# Patient Record
Sex: MALE | Race: WHITE | Employment: OTHER | URBAN - METROPOLITAN AREA
[De-identification: names, ages, dates, MRNs, and addresses within clinical notes are randomized per-mention and may not be internally consistent; named-entity substitution may affect disease eponyms.]

---

## 2018-04-24 ENCOUNTER — OFFICE VISIT (OUTPATIENT)
Dept: CARDIOLOGY CLINIC | Age: 48
End: 2018-04-24

## 2018-04-24 VITALS
WEIGHT: 196.12 LBS | OXYGEN SATURATION: 92 % | HEIGHT: 70 IN | DIASTOLIC BLOOD PRESSURE: 78 MMHG | HEART RATE: 89 BPM | SYSTOLIC BLOOD PRESSURE: 118 MMHG | BODY MASS INDEX: 28.08 KG/M2

## 2018-04-24 DIAGNOSIS — E78.2 MIXED HYPERLIPIDEMIA: ICD-10-CM

## 2018-04-24 DIAGNOSIS — Z72.0 TOBACCO ABUSE: ICD-10-CM

## 2018-04-24 DIAGNOSIS — I10 ESSENTIAL HYPERTENSION: ICD-10-CM

## 2018-04-24 DIAGNOSIS — R00.2 PALPITATIONS: Primary | ICD-10-CM

## 2018-04-24 PROCEDURE — 3017F COLORECTAL CA SCREEN DOC REV: CPT | Performed by: NURSE PRACTITIONER

## 2018-04-24 PROCEDURE — G8419 CALC BMI OUT NRM PARAM NOF/U: HCPCS | Performed by: NURSE PRACTITIONER

## 2018-04-24 PROCEDURE — 99214 OFFICE O/P EST MOD 30 MIN: CPT | Performed by: NURSE PRACTITIONER

## 2018-04-24 PROCEDURE — G8427 DOCREV CUR MEDS BY ELIG CLIN: HCPCS | Performed by: NURSE PRACTITIONER

## 2018-04-24 PROCEDURE — 4004F PT TOBACCO SCREEN RCVD TLK: CPT | Performed by: NURSE PRACTITIONER

## 2018-04-24 RX ORDER — NIACIN 100 MG
100 TABLET ORAL DAILY
COMMUNITY
End: 2019-09-17

## 2018-05-07 ENCOUNTER — HOSPITAL ENCOUNTER (OUTPATIENT)
Dept: NON INVASIVE DIAGNOSTICS | Age: 48
Discharge: HOME | End: 2018-05-07
Attending: NURSE PRACTITIONER | Admitting: NURSE PRACTITIONER

## 2018-05-07 DIAGNOSIS — R00.2 PALPITATIONS: ICD-10-CM

## 2018-05-07 LAB
LEFT VENTRICULAR EJECTION FRACTION HIGH VALUE: 60 %
LEFT VENTRICULAR EJECTION FRACTION MODE: NORMAL
LV EF: 55 %

## 2018-09-28 ENCOUNTER — OFFICE VISIT (OUTPATIENT)
Dept: ORTHOPEDIC SURGERY | Age: 48
End: 2018-09-28
Payer: COMMERCIAL

## 2018-09-28 VITALS
BODY MASS INDEX: 28.06 KG/M2 | HEART RATE: 85 BPM | DIASTOLIC BLOOD PRESSURE: 76 MMHG | SYSTOLIC BLOOD PRESSURE: 134 MMHG | WEIGHT: 196 LBS | HEIGHT: 70 IN

## 2018-09-28 DIAGNOSIS — M25.562 PAIN IN BOTH KNEES, UNSPECIFIED CHRONICITY: Primary | ICD-10-CM

## 2018-09-28 DIAGNOSIS — M25.40 PAINFUL SWELLING OF JOINT: ICD-10-CM

## 2018-09-28 DIAGNOSIS — M25.561 PAIN IN BOTH KNEES, UNSPECIFIED CHRONICITY: ICD-10-CM

## 2018-09-28 DIAGNOSIS — M25.561 PAIN IN BOTH KNEES, UNSPECIFIED CHRONICITY: Primary | ICD-10-CM

## 2018-09-28 DIAGNOSIS — M25.562 PAIN IN BOTH KNEES, UNSPECIFIED CHRONICITY: ICD-10-CM

## 2018-09-28 LAB
C-REACTIVE PROTEIN: 0.8 MG/L (ref 0–5.1)
RHEUMATOID FACTOR: <10 IU/ML
SEDIMENTATION RATE, ERYTHROCYTE: 2 MM/HR (ref 0–20)
URIC ACID, SERUM: 6 MG/DL (ref 3.5–7.2)

## 2018-09-28 PROCEDURE — 4004F PT TOBACCO SCREEN RCVD TLK: CPT | Performed by: ORTHOPAEDIC SURGERY

## 2018-09-28 PROCEDURE — G8427 DOCREV CUR MEDS BY ELIG CLIN: HCPCS | Performed by: ORTHOPAEDIC SURGERY

## 2018-09-28 PROCEDURE — 3017F COLORECTAL CA SCREEN DOC REV: CPT | Performed by: ORTHOPAEDIC SURGERY

## 2018-09-28 PROCEDURE — 99203 OFFICE O/P NEW LOW 30 MIN: CPT | Performed by: ORTHOPAEDIC SURGERY

## 2018-09-28 PROCEDURE — 20610 DRAIN/INJ JOINT/BURSA W/O US: CPT | Performed by: ORTHOPAEDIC SURGERY

## 2018-09-28 PROCEDURE — G8419 CALC BMI OUT NRM PARAM NOF/U: HCPCS | Performed by: ORTHOPAEDIC SURGERY

## 2018-09-28 RX ORDER — BETAMETHASONE SODIUM PHOSPHATE AND BETAMETHASONE ACETATE 3; 3 MG/ML; MG/ML
12 INJECTION, SUSPENSION INTRA-ARTICULAR; INTRALESIONAL; INTRAMUSCULAR; SOFT TISSUE ONCE
Status: COMPLETED | OUTPATIENT
Start: 2018-09-28 | End: 2018-09-28

## 2018-09-28 RX ADMIN — BETAMETHASONE SODIUM PHOSPHATE AND BETAMETHASONE ACETATE 12 MG: 3; 3 INJECTION, SUSPENSION INTRA-ARTICULAR; INTRALESIONAL; INTRAMUSCULAR; SOFT TISSUE at 11:12

## 2018-09-28 RX ADMIN — BETAMETHASONE SODIUM PHOSPHATE AND BETAMETHASONE ACETATE 12 MG: 3; 3 INJECTION, SUSPENSION INTRA-ARTICULAR; INTRALESIONAL; INTRAMUSCULAR; SOFT TISSUE at 11:13

## 2018-10-01 ENCOUNTER — TELEPHONE (OUTPATIENT)
Dept: ORTHOPEDIC SURGERY | Age: 48
End: 2018-10-01

## 2018-10-01 ENCOUNTER — TELEPHONE (OUTPATIENT)
Dept: CARDIOLOGY CLINIC | Age: 48
End: 2018-10-01

## 2018-10-01 DIAGNOSIS — R07.9 CHEST PAIN, UNSPECIFIED TYPE: ICD-10-CM

## 2018-10-01 DIAGNOSIS — R00.2 PALPITATIONS: ICD-10-CM

## 2018-10-01 DIAGNOSIS — R00.0 TACHYCARDIA: Primary | ICD-10-CM

## 2018-10-01 LAB
ANA INTERPRETATION: NORMAL
ANTI-NUCLEAR ANTIBODY (ANA): NEGATIVE

## 2019-01-04 RX ORDER — LISINOPRIL 5 MG/1
5 TABLET ORAL DAILY
Qty: 90 TABLET | Refills: 1 | Status: SHIPPED | OUTPATIENT
Start: 2019-01-04 | End: 2019-07-12 | Stop reason: SDUPTHER

## 2019-04-02 ENCOUNTER — OFFICE VISIT (OUTPATIENT)
Dept: ORTHOPEDIC SURGERY | Age: 49
End: 2019-04-02
Payer: COMMERCIAL

## 2019-04-02 DIAGNOSIS — S39.012A LUMBAR STRAIN, INITIAL ENCOUNTER: Primary | ICD-10-CM

## 2019-04-02 DIAGNOSIS — M54.59 MECHANICAL LOW BACK PAIN: ICD-10-CM

## 2019-04-02 PROCEDURE — G8427 DOCREV CUR MEDS BY ELIG CLIN: HCPCS | Performed by: INTERNAL MEDICINE

## 2019-04-02 PROCEDURE — 4004F PT TOBACCO SCREEN RCVD TLK: CPT | Performed by: INTERNAL MEDICINE

## 2019-04-02 PROCEDURE — 99213 OFFICE O/P EST LOW 20 MIN: CPT | Performed by: INTERNAL MEDICINE

## 2019-04-02 PROCEDURE — 3017F COLORECTAL CA SCREEN DOC REV: CPT | Performed by: INTERNAL MEDICINE

## 2019-04-02 PROCEDURE — G8419 CALC BMI OUT NRM PARAM NOF/U: HCPCS | Performed by: INTERNAL MEDICINE

## 2019-04-02 RX ORDER — CYCLOBENZAPRINE HCL 10 MG
10 TABLET ORAL NIGHTLY PRN
Qty: 20 TABLET | Refills: 1 | Status: SHIPPED | OUTPATIENT
Start: 2019-04-02 | End: 2019-04-12

## 2019-04-02 RX ORDER — KETOROLAC TROMETHAMINE 10 MG/1
10 TABLET, FILM COATED ORAL 3 TIMES DAILY
Qty: 15 TABLET | Refills: 0 | Status: SHIPPED | OUTPATIENT
Start: 2019-04-02 | End: 2019-09-17 | Stop reason: ALTCHOICE

## 2019-04-02 NOTE — PROGRESS NOTES
daily      clonazePAM (KLONOPIN) 1 MG tablet Take 1 mg by mouth 2 times daily as needed.  fish oil-omega-3 fatty acids 1000 MG capsule Take 2 g by mouth daily. No current facility-administered medications for this visit. Allergies:         Allergies   Allergen Reactions    Caffeine Other (See Comments)     Heart Palp    Betamethasone Palpitations and Other (See Comments)     Increased heart rate        Social History:         Social History     Socioeconomic History    Marital status:      Spouse name: Not on file    Number of children: Not on file    Years of education: Not on file    Highest education level: Not on file   Occupational History    Not on file   Social Needs    Financial resource strain: Not on file    Food insecurity:     Worry: Not on file     Inability: Not on file    Transportation needs:     Medical: Not on file     Non-medical: Not on file   Tobacco Use    Smoking status: Former Smoker    Smokeless tobacco: Current User   Substance and Sexual Activity    Alcohol use: Yes     Comment: seldom    Drug use: No    Sexual activity: Not on file   Lifestyle    Physical activity:     Days per week: Not on file     Minutes per session: Not on file    Stress: Not on file   Relationships    Social connections:     Talks on phone: Not on file     Gets together: Not on file     Attends Hoahaoism service: Not on file     Active member of club or organization: Not on file     Attends meetings of clubs or organizations: Not on file     Relationship status: Not on file    Intimate partner violence:     Fear of current or ex partner: Not on file     Emotionally abused: Not on file     Physically abused: Not on file     Forced sexual activity: Not on file   Other Topics Concern    Not on file   Social History Narrative    Not on file        Review of Symptoms:    Pertinent items are noted in HPI    Review of systems reviewed from Patient History Form dated on today's date and   available in the patient's chart under the Media tab. Vital Signs: There were no vitals filed for this visit. General Exam:     Constitutional: Patient is adequately groomed with no evidence of malnutrition  Mental Status: The patient is oriented to time, place and person. The patient's mood and affect are appropriate. Vascular: Examination reveals no swelling or calf tenderness. Peripheral pulses are palpable and 2+. Lymphatics: no lymphadenopathy of the inguinal region or lower extremity      Physical Exam: lower back      Primary Exam:    Inspection:  No deformity atrophy or appreciable curvature      Palpation:  A symmetric muscular spasm right paraxial with mild discomfort to palpation      Range of Motion:  80/10 mild pain in flexion greater than extension      Strength:  Normal lower extremity      Special Tests:  Negative SLR      Skin: There are no rashes, ulcerations or lesions. Gait: Normal      Reflex intact lower     Additional Comments:        Additional Examinations:           Right Lower Extremity: Examination of the right lower extremity does not show any tenderness, deformity or injury. Range of motion is unremarkable. There is no gross instability. There are no rashes, ulcerations or lesions. Strength and tone are normal.  Left Lower Extremity: Examination of the left lower extremity does not show any tenderness, deformity or injury. Range of motion is unremarkable. There is no gross instability. There are no rashes, ulcerations or lesions. Strength and tone are normal.  Neurolgic -Light touch sensation and manual muscle testing normal L2-S1. No fasiculations. Pattella tendon and Achilles tendon reflexes +2 bilaterally. Seated SLR negative          Office Imaging Results/Procedures PerformedToday:     3 views  Lumbar spine:     There is subtle rightward curvature on the AP projection 5 non rib-bearing vertebral bodies are evident lateral projection apologize for any errors. \"

## 2019-07-12 ENCOUNTER — TELEPHONE (OUTPATIENT)
Dept: CARDIOLOGY CLINIC | Age: 49
End: 2019-07-12

## 2019-07-12 RX ORDER — LISINOPRIL 5 MG/1
5 TABLET ORAL DAILY
Qty: 30 TABLET | Refills: 2 | Status: SHIPPED | OUTPATIENT
Start: 2019-07-12 | End: 2019-09-17 | Stop reason: SDUPTHER

## 2019-09-17 ENCOUNTER — HOSPITAL ENCOUNTER (OUTPATIENT)
Age: 49
Discharge: HOME | End: 2019-09-17
Payer: COMMERCIAL

## 2019-09-17 ENCOUNTER — TELEPHONE (OUTPATIENT)
Dept: CARDIOLOGY CLINIC | Age: 49
End: 2019-09-17

## 2019-09-17 ENCOUNTER — HOSPITAL ENCOUNTER (OUTPATIENT)
Dept: GENERAL RADIOLOGY | Age: 49
Discharge: HOME | End: 2019-09-17
Payer: COMMERCIAL

## 2019-09-17 ENCOUNTER — OFFICE VISIT (OUTPATIENT)
Dept: CARDIOLOGY CLINIC | Age: 49
End: 2019-09-17
Payer: COMMERCIAL

## 2019-09-17 VITALS
DIASTOLIC BLOOD PRESSURE: 62 MMHG | SYSTOLIC BLOOD PRESSURE: 132 MMHG | OXYGEN SATURATION: 97 % | WEIGHT: 207 LBS | HEIGHT: 70 IN | HEART RATE: 69 BPM | BODY MASS INDEX: 29.63 KG/M2

## 2019-09-17 DIAGNOSIS — R06.02 SOB (SHORTNESS OF BREATH): ICD-10-CM

## 2019-09-17 DIAGNOSIS — E78.2 MIXED HYPERLIPIDEMIA: ICD-10-CM

## 2019-09-17 DIAGNOSIS — R07.89 OTHER CHEST PAIN: ICD-10-CM

## 2019-09-17 DIAGNOSIS — I10 ESSENTIAL HYPERTENSION: Primary | ICD-10-CM

## 2019-09-17 PROCEDURE — 71046 X-RAY EXAM CHEST 2 VIEWS: CPT

## 2019-09-17 PROCEDURE — 4004F PT TOBACCO SCREEN RCVD TLK: CPT | Performed by: NURSE PRACTITIONER

## 2019-09-17 PROCEDURE — G8427 DOCREV CUR MEDS BY ELIG CLIN: HCPCS | Performed by: NURSE PRACTITIONER

## 2019-09-17 PROCEDURE — G8419 CALC BMI OUT NRM PARAM NOF/U: HCPCS | Performed by: NURSE PRACTITIONER

## 2019-09-17 PROCEDURE — 3017F COLORECTAL CA SCREEN DOC REV: CPT | Performed by: NURSE PRACTITIONER

## 2019-09-17 PROCEDURE — 99214 OFFICE O/P EST MOD 30 MIN: CPT | Performed by: NURSE PRACTITIONER

## 2019-09-17 RX ORDER — LISINOPRIL 5 MG/1
5 TABLET ORAL DAILY
Qty: 90 TABLET | Refills: 3 | Status: SHIPPED | OUTPATIENT
Start: 2019-09-17 | End: 2020-02-11 | Stop reason: ALTCHOICE

## 2019-09-17 NOTE — PROGRESS NOTES
PND, orthopnea or cough. CARDIOVASCULAR: See HPI  GI: No nausea, vomiting, diarrhea, constipation, abdominal pain or changes in bowel habits. : No urinary frequency, urgency, incontinence hematuria or dysuria. SKIN: No cyanosis or skin lesions. MUSCULOSKELETAL: No new muscle or joint pain. NEUROLOGICAL: No syncope or TIA-like symptoms. PSYCHIATRIC: No anxiety, pain, insomnia or depression    Objective:   PHYSICAL EXAM:       Vitals:    09/17/19 0929 09/17/19 0959   BP: 132/82 132/62   Site: Right Upper Arm Left Upper Arm   Position: Sitting    Cuff Size: Medium Adult Large Adult   Pulse: 69    SpO2: 97%    Weight: 207 lb (93.9 kg)    Height: 5' 10\" (1.778 m)         VITALS:  /82 (Site: Right Upper Arm, Position: Sitting, Cuff Size: Medium Adult)   Pulse 69   Ht 5' 10\" (1.778 m)   Wt 207 lb (93.9 kg)   SpO2 97%   BMI 29.70 kg/m²   CONSTITUTIONAL: Cooperative, no apparent distress, and appears well nourished / developed  NEUROLOGIC:  Awake and orientated to person, place and time. PSYCH: Calm affect. SKIN: Warm and dry. HEENT: Sclera non-icteric, normocephalic, neck supple, no elevation of JVP, normal carotid pulses with no bruits and thyroid normal size. LUNGS:  No increased work of breathing 64 and clear to auscultation, no crackles or wheezing  CARDIOVASCULAR:  Regular rate and rhythm with no murmurs, gallops, rubs, or abnormal heart sounds, normal PMI. The apical impulses not displaced  JVP less than 8 cm H2O  Heart tones are crisp and normal  Cervical veins are not engorged  The carotid upstroke is normal in amplitude and contour without delay or bruit  JVP is not elevated  ABDOMEN:  Normal bowel sounds, non-distended and non-tender to palpation  EXT: No edema, no calf tenderness. Pulses are present bilaterally.     DATA:      LABS: 2/6/19:   Na+ 140 K+ 4.4 chl 104 CO2 31 BUN 16 creatinine 1.14 glu 104    Radiology Review:  Pertinent images / reports were reviewed as a part of this visit and

## 2019-11-12 ENCOUNTER — TELEPHONE (OUTPATIENT)
Dept: CARDIOLOGY CLINIC | Age: 49
End: 2019-11-12

## 2019-11-12 DIAGNOSIS — M79.10 MUSCLE PAIN: Primary | ICD-10-CM

## 2019-11-14 ENCOUNTER — TELEPHONE (OUTPATIENT)
Dept: CARDIOLOGY CLINIC | Age: 49
End: 2019-11-14

## 2020-02-10 ENCOUNTER — TELEPHONE (OUTPATIENT)
Dept: CARDIOLOGY CLINIC | Age: 50
End: 2020-02-10

## 2020-02-10 NOTE — TELEPHONE ENCOUNTER
Called and spoke to pt and he isn't having any other symptoms just checked his BP on a whim at [facility] and realized that it was a high reading so he monitored it over the weekend - then earlier this morning it was 162/100 with the sweats  Right now this morning his BP came down 145/86  Was on a benzo for 20 years (clonopin) and is taking lisinopril 5mg BID stated the bottle says to take once aday.

## 2020-02-11 ENCOUNTER — OFFICE VISIT (OUTPATIENT)
Dept: CARDIOLOGY CLINIC | Age: 50
End: 2020-02-11
Payer: COMMERCIAL

## 2020-02-11 VITALS
HEART RATE: 85 BPM | HEIGHT: 71 IN | SYSTOLIC BLOOD PRESSURE: 142 MMHG | DIASTOLIC BLOOD PRESSURE: 88 MMHG | WEIGHT: 210 LBS | OXYGEN SATURATION: 96 % | BODY MASS INDEX: 29.4 KG/M2

## 2020-02-11 PROCEDURE — G8427 DOCREV CUR MEDS BY ELIG CLIN: HCPCS | Performed by: NURSE PRACTITIONER

## 2020-02-11 PROCEDURE — G8484 FLU IMMUNIZE NO ADMIN: HCPCS | Performed by: NURSE PRACTITIONER

## 2020-02-11 PROCEDURE — G8419 CALC BMI OUT NRM PARAM NOF/U: HCPCS | Performed by: NURSE PRACTITIONER

## 2020-02-11 PROCEDURE — 3017F COLORECTAL CA SCREEN DOC REV: CPT | Performed by: NURSE PRACTITIONER

## 2020-02-11 PROCEDURE — 99214 OFFICE O/P EST MOD 30 MIN: CPT | Performed by: NURSE PRACTITIONER

## 2020-02-11 PROCEDURE — 4004F PT TOBACCO SCREEN RCVD TLK: CPT | Performed by: NURSE PRACTITIONER

## 2020-02-11 RX ORDER — LISINOPRIL 10 MG/1
10 TABLET ORAL 2 TIMES DAILY
Qty: 60 TABLET | Refills: 5 | Status: SHIPPED | OUTPATIENT
Start: 2020-02-11 | End: 2020-06-19 | Stop reason: DRUGHIGH

## 2020-02-13 NOTE — PROGRESS NOTES
pain or changes in bowel habits. : No urinary frequency, urgency, incontinence hematuria or dysuria. SKIN: No cyanosis or skin lesions. MUSCULOSKELETAL: No new muscle or joint pain. NEUROLOGICAL: No syncope or TIA-like symptoms. PSYCHIATRIC: No anxiety, pain, insomnia or depression    Objective:   PHYSICAL EXAM:        VITALS:    Vitals:    02/11/20 1445   BP: (!) 142/88   Pulse:    SpO2:          CONSTITUTIONAL: Cooperative, no apparent distress, and appears well nourished / developed  NEUROLOGIC:  Awake and orientated to person, place and time. PSYCH: Calm affect. SKIN: Warm and dry. HEENT: Sclera non-icteric, normocephalic, neck supple, no elevation of JVP, normal carotid pulses with no bruits and thyroid normal size. LUNGS:  No increased work of breathing and clear to auscultation, no crackles or wheezing. CARDIOVASCULAR:  Regular rate and rhythm with no murmurs, gallops, rubs, or abnormal heart sounds, normal PMI. The apical impulses not displaced. Heart tones are crisp and normal                                                                          Cervical veins are not engorged                 JVP less than 8 cm H2O                                                                              The carotid upstroke is normal in amplitude and contour without delay or bruit    ABDOMEN:  Normal bowel sounds, non-distended and non-tender to palpation   EXT: No edema, no calf tenderness. Pulses are present bilaterally.     DATA:    No results found for: ALT, AST, GGT, ALKPHOS, BILITOT  Lab Results   Component Value Date    CREATININE 0.9 05/23/2017    BUN 14 05/23/2017     05/23/2017    K 3.7 05/23/2017     05/23/2017    CO2 27 05/23/2017     No results found for: TSH, P9XGQSN, P3EYKUZ, THYROIDAB  Lab Results   Component Value Date    WBC 6.8 05/23/2017    HGB 15.2 05/23/2017    HCT 45.7 05/23/2017    MCV 88.5 05/23/2017     05/23/2017     No components found for: CHLPL  Lab Results Component Value Date    TRIG 117 12/08/2015     Lab Results   Component Value Date    HDL 42 12/08/2015     Lab Results   Component Value Date    LDLCALC 156 (H) 12/08/2015     Lab Results   Component Value Date    LABVLDL 23 12/08/2015     Radiology Review:  Pertinent images / reports were reviewed as a part of this visit and reveals the following:    Last stress ECHO: 5/7/2018     Summary   -Excellent exercise tolerance.   -Normal stress echocardiogram study.   -Normal left ventricle size, wall thickness and systolic function with an   estimated ejection fraction of 55-60%. -No regional wall motion abnormalities are seen.   -Normal diastolic function. E/e=6.97   -Trivial pulmonic and mitral regurgitation present. Stress ECHO: 1/15   Normal stress echocardiogram study.     Assessment:     1. Hypertension - today's B/P 142/88- sub-optimal continue current medications    2. Tobacco abuse: Patient quit chewing tobacco for one week. 3.  Hyperlipidemia: 12/2015: , HDL 42, , ;        Patient does not want to take statins        Followed per PCP   Patient is stable since last office visit. Plan:   Continue current medications  Follow up in one week     I have addressed the patient's cardiac risk factors and adjusted pharmacologic treatment as needed. In addition, I have reinforced the need for patient directed risk factor modification. Further evaluation will be based upon the patient's clinical course and testing results. All questions and concerns were addressed to the patient/family. Alternatives to  treatment were discussed. The patient  currently  is not smoking. The risks related to smoking were reviewed with the patient. Recommend maintaining a smoke-free lifestyle. Products available for smoking cessation were discussed. Daily weight, low sodium diet were discussed.  Patient instructed to call the office with a weight gain: > 3 lbs over night or 5 lbs in one week; swelling, SOB/orthopnea/PND        Pt is not on a BB  Pt is on an ace-i/ARB  Pt is not  on a statin    Saturated fat diet discussed  Exercise program discussed    Thank you for allowing to us to participate in the care of [address] [place] [address] NeLeila       [facility]

## 2020-02-27 ENCOUNTER — OFFICE VISIT (OUTPATIENT)
Dept: PRIMARY CARE CLINIC | Age: 50
End: 2020-02-27
Payer: COMMERCIAL

## 2020-02-27 VITALS
RESPIRATION RATE: 16 BRPM | HEART RATE: 92 BPM | HEIGHT: 70 IN | BODY MASS INDEX: 29.63 KG/M2 | TEMPERATURE: 98 F | WEIGHT: 207 LBS | SYSTOLIC BLOOD PRESSURE: 132 MMHG | OXYGEN SATURATION: 98 % | DIASTOLIC BLOOD PRESSURE: 80 MMHG

## 2020-02-27 PROBLEM — G62.9 NEUROPATHY: Status: ACTIVE | Noted: 2020-02-27

## 2020-02-27 PROCEDURE — 1036F TOBACCO NON-USER: CPT | Performed by: INTERNAL MEDICINE

## 2020-02-27 PROCEDURE — G8427 DOCREV CUR MEDS BY ELIG CLIN: HCPCS | Performed by: INTERNAL MEDICINE

## 2020-02-27 PROCEDURE — G8484 FLU IMMUNIZE NO ADMIN: HCPCS | Performed by: INTERNAL MEDICINE

## 2020-02-27 PROCEDURE — G8419 CALC BMI OUT NRM PARAM NOF/U: HCPCS | Performed by: INTERNAL MEDICINE

## 2020-02-27 PROCEDURE — 3017F COLORECTAL CA SCREEN DOC REV: CPT | Performed by: INTERNAL MEDICINE

## 2020-02-27 PROCEDURE — 99203 OFFICE O/P NEW LOW 30 MIN: CPT | Performed by: INTERNAL MEDICINE

## 2020-02-27 SDOH — HEALTH STABILITY: PHYSICAL HEALTH: ON AVERAGE, HOW MANY MINUTES DO YOU ENGAGE IN EXERCISE AT THIS LEVEL?: 20 MIN

## 2020-02-27 SDOH — HEALTH STABILITY: MENTAL HEALTH: HOW OFTEN DO YOU HAVE A DRINK CONTAINING ALCOHOL?: MONTHLY OR LESS

## 2020-02-27 SDOH — HEALTH STABILITY: PHYSICAL HEALTH: ON AVERAGE, HOW MANY DAYS PER WEEK DO YOU ENGAGE IN MODERATE TO STRENUOUS EXERCISE (LIKE A BRISK WALK)?: 7 DAYS

## 2020-02-27 ASSESSMENT — ENCOUNTER SYMPTOMS
NAUSEA: 0
SORE THROAT: 0
EYE DISCHARGE: 0
TROUBLE SWALLOWING: 0
RHINORRHEA: 0
SINUS PRESSURE: 0
ABDOMINAL PAIN: 0
EYE PAIN: 0
VOMITING: 0
SINUS PAIN: 0
SHORTNESS OF BREATH: 0
BLOOD IN STOOL: 0
WHEEZING: 0
CHEST TIGHTNESS: 0
COUGH: 0

## 2020-02-27 NOTE — PROGRESS NOTES
2020    Petar Crawley (: 1970) is a 44 y.o. male, here for evaluation of the following medical concerns:    Chief Complaint   Patient presents with    Established New Doctor       For breakfast eats oatmeal with honey. [place] [facility] sandwich yesterday for lunch. For supper pork line mashed potatoes and sauerkraut. I explained to him he is not watching low-fat diet or low-sodium diet that well.     hypertension    Watching low-sodium diet. His blood pressures going up when he stopped taking benzodiazepines and then lisinopril was increased to 10 mg and now he saw the cardiologist and they have increased  to 10 mg twice a day. Taking blood pressure medications regularly. Blood pressure checked off and on and trying to keep a goal of blood pressure less than 130/85 most of the time. Denies any chest pain / palpitation / shortness of breath / lightheadedness etc.     He has been taking benzodiazepines for 30 years and that was Klonopin 0.5 to 1 mg once a day. And he stopped taking 2019. He has had withdrawal jerkiness of the arm or leg. I explained to him there are risk of benzodiazepine withdrawal seizures also. He needs to keep off those medication and work on self relaxation and meditation. He has tingling numbness in the right hand fourth and fifth finger that he has been noticing more and he has seen a neurologist and they have done blood work and EMG and has found nothing significant. He may be getting that also with benzodiazepine withdrawal possible. Hyperlipidemia    he has not been watching low fat diet. Reminded to keep doing regular exercise 3 to 4 days a week. He does not like statin cholesterol medication and heard a lot about bad side effects. I explained to him that he has a strong family history of coronary artery disease with both parents affected.   He has seen cardiologist and has had stress test done before and they have been unremarkable so far. He has not had heart scan done so far. I explained to him at length the benefits and the risk of statin cholesterol lowering medication. I explained to him that 1 out of 5 patients with heart attack may not make it to the emergency room. That is why prevention of the heart attack is very important. That is why we prescribe statin cholesterol lowering medication or he has to be very strict diet if that works or not-- there are no guarantees. Even with the statin cholesterol lowering medication he still needs to watch his diet. Review of Systems   Constitutional: Negative for appetite change, chills, fever and unexpected weight change. HENT: Negative for congestion, ear discharge, ear pain, nosebleeds, rhinorrhea, sinus pressure, sinus pain, sore throat and trouble swallowing. Eyes: Negative for pain and discharge. Respiratory: Negative for cough, chest tightness, shortness of breath and wheezing. Cardiovascular: Negative for chest pain, palpitations and leg swelling. Gastrointestinal: Negative for abdominal pain, blood in stool, nausea and vomiting. Endocrine: Negative for polydipsia and polyphagia. Genitourinary: Negative for difficulty urinating, enuresis, flank pain and hematuria. Musculoskeletal: Negative for myalgias. Skin: Negative for rash. Neurological: Positive for numbness (  Tingling on the hand fourth and fifth fingers more on the right). Negative for facial asymmetry, weakness, light-headedness and headaches. Psychiatric/Behavioral: Negative for confusion.        Current Outpatient Medications on File Prior to Visit   Medication Sig Dispense Refill    lisinopril (PRINIVIL;ZESTRIL) 10 MG tablet Take 1 tablet by mouth 2 times daily 60 tablet 5    Misc Natural Products (TURMERIC CURCUMIN) CAPS Take 1 tablet by mouth daily      APPLE CIDER VINEGAR PO Take 2 capsules by mouth daily       No current facility-administered medications on file prior to

## 2020-02-27 NOTE — PATIENT INSTRUCTIONS
Records from Dr. Cancino Self. No caffeine. Self relaxation--meditation. May use headspace josue. Heart scan from [employer]. Low-fat diet and low-sodium diet. Hypertension    Keep low sodium diet. Avoid potato chips, pretzels, sauerkraut , ham , sausage, mccallum , salty crackers , salty french fries, salty nuts, salty popcorn etc.  Use only low sodium soups. No salted canned vegetables. Use fresh or frozen vegetables. No salt shaker use. Avoid weight gain. Regular exercise program.  Keep taking blood pressure medications regularly. If blood pressure staying above 130/85 or having side effects with blood pressure medications, then bring the blood pressure and pulse recorded twice a day to an appointment sooner than scheduled one.

## 2020-02-28 DIAGNOSIS — E78.00 PURE HYPERCHOLESTEROLEMIA: ICD-10-CM

## 2020-02-28 DIAGNOSIS — I10 ESSENTIAL HYPERTENSION: ICD-10-CM

## 2020-02-28 LAB
A/G RATIO: 1.9 (ref 1.1–2.2)
ALBUMIN SERPL-MCNC: 4.6 G/DL (ref 3.4–5)
ALP BLD-CCNC: 72 U/L (ref 40–129)
ALT SERPL-CCNC: 28 U/L (ref 10–40)
ANION GAP SERPL CALCULATED.3IONS-SCNC: 13 MMOL/L (ref 3–16)
AST SERPL-CCNC: 21 U/L (ref 15–37)
BILIRUB SERPL-MCNC: 0.8 MG/DL (ref 0–1)
BUN BLDV-MCNC: 17 MG/DL (ref 7–20)
CALCIUM SERPL-MCNC: 9.6 MG/DL (ref 8.3–10.6)
CHLORIDE BLD-SCNC: 97 MMOL/L (ref 99–110)
CHOLESTEROL, TOTAL: 225 MG/DL (ref 0–199)
CO2: 28 MMOL/L (ref 21–32)
CREAT SERPL-MCNC: 1.1 MG/DL (ref 0.9–1.3)
GFR AFRICAN AMERICAN: >60
GFR NON-AFRICAN AMERICAN: >60
GLOBULIN: 2.4 G/DL
GLUCOSE BLD-MCNC: 106 MG/DL (ref 70–99)
HDLC SERPL-MCNC: 40 MG/DL (ref 40–60)
LDL CHOLESTEROL CALCULATED: 146 MG/DL
POTASSIUM SERPL-SCNC: 5 MMOL/L (ref 3.5–5.1)
SODIUM BLD-SCNC: 138 MMOL/L (ref 136–145)
TOTAL PROTEIN: 7 G/DL (ref 6.4–8.2)
TRIGL SERPL-MCNC: 197 MG/DL (ref 0–150)
VLDLC SERPL CALC-MCNC: 39 MG/DL

## 2020-03-09 ENCOUNTER — OFFICE VISIT (OUTPATIENT)
Dept: PRIMARY CARE CLINIC | Age: 50
End: 2020-03-09
Payer: COMMERCIAL

## 2020-03-09 ENCOUNTER — TELEPHONE (OUTPATIENT)
Dept: PRIMARY CARE CLINIC | Age: 50
End: 2020-03-09

## 2020-03-09 VITALS
SYSTOLIC BLOOD PRESSURE: 122 MMHG | HEIGHT: 70 IN | TEMPERATURE: 97.8 F | BODY MASS INDEX: 30.01 KG/M2 | RESPIRATION RATE: 16 BRPM | DIASTOLIC BLOOD PRESSURE: 70 MMHG | WEIGHT: 209.6 LBS | HEART RATE: 90 BPM | OXYGEN SATURATION: 99 %

## 2020-03-09 PROBLEM — R73.9 HYPERGLYCEMIA: Status: ACTIVE | Noted: 2020-03-09

## 2020-03-09 LAB — HBA1C MFR BLD: 4.5 %

## 2020-03-09 PROCEDURE — 3017F COLORECTAL CA SCREEN DOC REV: CPT | Performed by: INTERNAL MEDICINE

## 2020-03-09 PROCEDURE — G8427 DOCREV CUR MEDS BY ELIG CLIN: HCPCS | Performed by: INTERNAL MEDICINE

## 2020-03-09 PROCEDURE — G8484 FLU IMMUNIZE NO ADMIN: HCPCS | Performed by: INTERNAL MEDICINE

## 2020-03-09 PROCEDURE — 83036 HEMOGLOBIN GLYCOSYLATED A1C: CPT | Performed by: INTERNAL MEDICINE

## 2020-03-09 PROCEDURE — 99213 OFFICE O/P EST LOW 20 MIN: CPT | Performed by: INTERNAL MEDICINE

## 2020-03-09 PROCEDURE — G8417 CALC BMI ABV UP PARAM F/U: HCPCS | Performed by: INTERNAL MEDICINE

## 2020-03-09 PROCEDURE — 1036F TOBACCO NON-USER: CPT | Performed by: INTERNAL MEDICINE

## 2020-03-09 ASSESSMENT — ENCOUNTER SYMPTOMS
EYE DISCHARGE: 0
SHORTNESS OF BREATH: 0
EYE PAIN: 1
RHINORRHEA: 0
COUGH: 0
SINUS PRESSURE: 0
ABDOMINAL PAIN: 0
VOMITING: 0
SORE THROAT: 0
TROUBLE SWALLOWING: 0
NAUSEA: 0
CHEST TIGHTNESS: 0
SINUS PAIN: 0
WHEEZING: 0
BLOOD IN STOOL: 0

## 2020-03-09 NOTE — PROGRESS NOTES
3/9/2020     Rodri Chawla (: 1970) is a 44 y.o. male, here for evaluation of the following medical concerns:    Chief Complaint   Patient presents with    2 Week Follow-Up     lab work results         Hyperlipidemia    he has been watching low fat diet. Run 20 minutes every day--treadmill--try to increase to 3 miles a day. He runs 20minutes every day and has no chest pain or cardiac symptoms. Reminded to keep doing regular exercise 3 to 4 days a week. Must keep trying to lose weight. Lab Results       Component                Value               Date                he did not undergo heart scan testing at [facility] yet. Due to the cost.  He understands the benefits and risks with delayed treatment. Risk of heart attack etc. with exercise explained and reminded. CHOL                     225 (H)             2020                 TRIG                     197 (H)             2020                 HDL                      40                  2020                 ALT                      28                  2020                 AST                      21                  2020              Hypertension    Watching low-sodium diet. Taking blood pressure medications regularly. Blood pressure checked off and on and trying to keep a goal of blood pressure less than 130/85 most of the time.    Denies any chest pain / palpitation / shortness of breath / lightheadedness etc.   Lab Results       Component                Value               Date                       NA                       138                 2020                 K                        5.0                 2020                 CL                       97                  2020                 CO2                      28                  2020                 BUN                      17                  2020                 CREATININE               1.1 Negative for confusion. Current Outpatient Medications on File Prior to Visit   Medication Sig Dispense Refill    lisinopril (PRINIVIL;ZESTRIL) 10 MG tablet Take 1 tablet by mouth 2 times daily 60 tablet 5    Misc Natural Products (TURMERIC CURCUMIN) CAPS Take 1 tablet by mouth daily      APPLE CIDER VINEGAR PO Take 2 capsules by mouth daily       No current facility-administered medications on file prior to visit. Past Medical History:   Diagnosis Date    Anxiety     Hypertension       Social History     Tobacco Use    Smoking status: Former Smoker    Smokeless tobacco: Former User   Substance Use Topics    Alcohol use: Yes     Frequency: Monthly or less     Comment: seldom      Family History   Problem Relation Age of Onset    Heart Disease Mother         stents    Heart Disease Father         stents    Stroke Neg Hx         Vitals:    03/09/20 1106   BP: 122/70   Site: Left Upper Arm   Position: Sitting   Cuff Size: Medium Adult   Pulse: 90   Resp: 16   Temp: 97.8 °F (36.6 °C)   SpO2: 99%   Weight: 209 lb 9.6 oz (95.1 kg)   Height: 5' 10\" (1.778 m)     Estimated body mass index is 30.07 kg/m² as calculated from the following:    Height as of this encounter: 5' 10\" (1.778 m). Weight as of this encounter: 209 lb 9.6 oz (95.1 kg). Physical Exam  Vitals signs and nursing note reviewed. Constitutional:       General: He is not in acute distress. HENT:      Head: Normocephalic and atraumatic. Right Ear: Hearing, tympanic membrane, ear canal and external ear normal.      Left Ear: Hearing, tympanic membrane and external ear normal.      Nose: Congestion present. Mouth/Throat:      Lips: Pink. Mouth: Mucous membranes are moist.      Pharynx: Oropharynx is clear. Uvula midline. Posterior oropharyngeal erythema (  Minimal as an allergy) present.    Eyes:      General: Lids are normal.      Conjunctiva/sclera: Conjunctivae normal.      Pupils: Pupils are equal, round, and reactive to light. Neck:      Musculoskeletal: Neck supple. Thyroid: No thyromegaly. Vascular: No JVD. Trachea: No tracheal deviation. Cardiovascular:      Rate and Rhythm: Normal rate and regular rhythm. Heart sounds: Normal heart sounds. No gallop. Pulmonary:      Effort: Pulmonary effort is normal. No respiratory distress. Breath sounds: Normal breath sounds. No wheezing or rales. Abdominal:      General: Bowel sounds are normal.      Palpations: Abdomen is soft. There is no mass. Tenderness: There is no abdominal tenderness. Musculoskeletal:         General: No tenderness. Comments: No leg edema or calf tenderness   Lymphadenopathy:      Cervical: No cervical adenopathy. Skin:     General: Skin is warm and dry. Findings: No rash. Neurological:      Mental Status: He is alert and oriented to person, place, and time. Cranial Nerves: No cranial nerve deficit. Sensory: No sensory deficit. Motor: Motor function is intact. Coordination: Coordination is intact. Gait: Gait is intact. Comments: Right cubital tunnel syndrome symptoms at times. Psychiatric:         Attention and Perception: Attention and perception normal.         Mood and Affect: Mood and affect normal.         Speech: Speech normal.         Behavior: Behavior normal.         Thought Content: Thought content normal.         Cognition and Memory: Cognition and memory normal.         Judgment: Judgment normal.         ASSESSMENT/PLAN:  1. Hyperglycemia  Avoid sweets and lose weight.  - POCT glycosylated hemoglobin (Hb A1C)    2. Essential hypertension  Low-sodium diet  - BASIC METABOLIC PANEL; Future    3. Pure hypercholesterolemia  Do heart scan and low-fat diet  - LIPID PANEL; Future    4. Neuropathy  Check with the neurologist  - METHYLMALONIC ACID, SERUM; Future  - FOLATE; Future  Can see him sooner and do sooner testing if needed.     Return in about 6 months (around

## 2020-03-09 NOTE — PATIENT INSTRUCTIONS
Do the Proscan heart scan to see if high cholesterol affecting his heart or not. May increase exercise to 30 minutes every day. Keep low-fat diet. Low-sodium diet and watching blood pressure. Lose weight to target 190 pounds and maintain around that. Get eyes checked to make sure no glaucoma. Loratadine or Claritin 10 mg as needed for allergies    May do the neurologist test follow-up for the right hand to rule out cubital tunnel or carpal tunnel etiology with right hand fourth and fifth finger numbness in different positions. 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 Over the counter vitamin D3 2000 units every day and vitamin B12 1000 units every day. Magnesium oxide 500 mg a day. Discussed use, benefit, and side effects of prescribed medications. Barriers to compliance discussed. All patient questions answered. Pt voiced understanding. IF YOU NEED A PRESCRIPTION REFILL, THEN PLEASE GIVE US THREE WORKING DAYS TO REFILL A PRESCRIPTION.

## 2020-04-20 ENCOUNTER — TELEPHONE (OUTPATIENT)
Dept: CARDIOLOGY CLINIC | Age: 50
End: 2020-04-20

## 2020-04-20 NOTE — TELEPHONE ENCOUNTER
Called pt and he stated that he has been to the ENT 2 times and that they have not found anything. Pt stated that his throat feels tight and when he gets up and down he gets light headed. His blood pressure and heart has been jumping around, pt stated that he has been taking his BP these are at rest. List is below.      B/P 130/85  HR: 57  B/P 143/87 HR: 64   B/P 141/85 HR: 69  B/P 144/91 HR:64  B/P 147/86 HR:61

## 2020-04-20 NOTE — TELEPHONE ENCOUNTER
His lisinopril was increased recently . His throat feels tight so he went to ENT but they found nothing . He looked on line and lisinopril can cause chest pain . When his HR is up his b/p is normal , but when his HR is normal his b/p is high. What should he do ?

## 2020-06-02 ENCOUNTER — VIRTUAL VISIT (OUTPATIENT)
Dept: PRIMARY CARE CLINIC | Age: 50
End: 2020-06-02
Payer: COMMERCIAL

## 2020-06-02 VITALS — WEIGHT: 200 LBS | HEIGHT: 70 IN | BODY MASS INDEX: 28.63 KG/M2

## 2020-06-02 PROBLEM — F41.9 ANXIETY: Status: ACTIVE | Noted: 2020-06-02

## 2020-06-02 PROBLEM — G62.9 NEUROPATHY: Status: RESOLVED | Noted: 2020-02-27 | Resolved: 2020-06-02

## 2020-06-02 PROBLEM — J30.1 NON-SEASONAL ALLERGIC RHINITIS DUE TO POLLEN: Status: ACTIVE | Noted: 2020-06-02

## 2020-06-02 PROBLEM — R73.09 IMPAIRED GLUCOSE METABOLISM: Status: ACTIVE | Noted: 2020-03-09

## 2020-06-02 PROCEDURE — 99214 OFFICE O/P EST MOD 30 MIN: CPT | Performed by: INTERNAL MEDICINE

## 2020-06-02 RX ORDER — FLUTICASONE PROPIONATE 50 MCG
1 SPRAY, SUSPENSION (ML) NASAL 2 TIMES DAILY PRN
Qty: 1 BOTTLE | Refills: 0 | Status: SHIPPED | OUTPATIENT
Start: 2020-06-02 | End: 2020-11-20

## 2020-06-02 RX ORDER — PRAVASTATIN SODIUM 20 MG
20 TABLET ORAL
Qty: 30 TABLET | Refills: 0 | Status: SHIPPED | OUTPATIENT
Start: 2020-06-02 | End: 2020-11-20

## 2020-06-02 ASSESSMENT — PATIENT HEALTH QUESTIONNAIRE - PHQ9
2. FEELING DOWN, DEPRESSED OR HOPELESS: 1
SUM OF ALL RESPONSES TO PHQ QUESTIONS 1-9: 2
SUM OF ALL RESPONSES TO PHQ9 QUESTIONS 1 & 2: 2
SUM OF ALL RESPONSES TO PHQ QUESTIONS 1-9: 2
1. LITTLE INTEREST OR PLEASURE IN DOING THINGS: 1

## 2020-06-02 ASSESSMENT — ENCOUNTER SYMPTOMS
SINUS PAIN: 0
CHEST TIGHTNESS: 0
BLOOD IN STOOL: 0
EYE PAIN: 0
RHINORRHEA: 0
VOMITING: 0
SINUS PRESSURE: 0
NAUSEA: 0
VISUAL CHANGE: 0
COUGH: 0
SORE THROAT: 0
CHANGE IN BOWEL HABIT: 0
TROUBLE SWALLOWING: 0
SWOLLEN GLANDS: 0
EYE DISCHARGE: 0
ABDOMINAL PAIN: 0
SHORTNESS OF BREATH: 0
WHEEZING: 0

## 2020-06-02 NOTE — PATIENT INSTRUCTIONS
Blood work on July 2 and follow-up few days after that. Keep avoiding caffeine and keep doing regular exercise. head space josue for anxiety. Prayers / music /meditation/yoga. Loratadine 10 mg over-the-counter as needed or Flonase twice a day each nostril as needed or Sheila pot to help with nasal allergic symptoms bothering the ears. Hypertension    Keep low sodium diet. Avoid potato chips, pretzels, sauerkraut , ham , sausage, mccallum , salty crackers , salty french fries, salty nuts, salty popcorn etc.  Use only low sodium soups. No salted canned vegetables. Use fresh or frozen vegetables. No salt shaker use. Avoid weight gain. Regular exercise program.  Keep taking blood pressure medications regularly. If blood pressure staying above 130/85 or having side effects with blood pressure medications, then bring the blood pressure and pulse recorded twice a day to an appointment sooner than scheduled one. Hyperlipidemia    Keep low fat diet. Keep trying to get to a healthy weight. Keep regular exercise program.  Keep taking cholesterol lowering medication regularly. Avoid sweets to avoid diabetes. Regular exercise. Discussed use, benefit, and side effects of prescribed medications. Barriers to compliance discussed. All patient questions answered. Pt voiced understanding. IF YOU NEED A PRESCRIPTION REFILL, THEN PLEASE GIVE US THREE WORKING DAYS TO REFILL A PRESCRIPTION.

## 2020-06-02 NOTE — PROGRESS NOTES
2020     Corey Jiménez (: 1970) is a 44 y.o. male, here for evaluation of the following medical concerns:    Chief Complaint   Patient presents with    Anxiety     feeling panicky         Ear --pressure---nose drainage--camping---no sneezing today and ear pressure is better too. 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 He did see ENT specialist and that they did not see any problem in his throat with the scope. Anxiety disorder-PHQ 2 score 2. He used to take Klonopin and he has been off Klonopin and doing fine other than getting panic episodes at times with also coronavirus cautions recently. He has been doing some meditation and exercise to help with that. He does not take any medication for that. Hypertension    Watching low-sodium diet. bp pmep353/80--pulse 60-62  Running everyday on treadmill and feels fine with no chest pain or shortness of breath. He has been doing weight lifting 3 days a week and has lost weight 2. Taking blood pressure medications regularly. Blood pressure checked off and on and trying to keep a goal of blood pressure less than 130/85 most of the time.    Denies any chest pain / palpitation / shortness of breath / lightheadedness etc.   Lab Results       Component                Value               Date                       NA                       138                 2020                 K                        5.0                 2020                 CL                       97                  2020                 CO2                      28                  2020                 BUN                      17                  2020                 CREATININE               1.1                 2020                 GLUCOSE                  106                 2020                 CALCIUM                  9.6                 2020

## 2020-06-18 ENCOUNTER — TELEPHONE (OUTPATIENT)
Dept: PRIMARY CARE CLINIC | Age: 50
End: 2020-06-18

## 2020-06-19 ENCOUNTER — VIRTUAL VISIT (OUTPATIENT)
Dept: PRIMARY CARE CLINIC | Age: 50
End: 2020-06-19
Payer: COMMERCIAL

## 2020-06-19 ENCOUNTER — TELEPHONE (OUTPATIENT)
Dept: PRIMARY CARE CLINIC | Age: 50
End: 2020-06-19

## 2020-06-19 PROBLEM — R19.7 DIARRHEA: Status: ACTIVE | Noted: 2020-06-19

## 2020-06-19 PROBLEM — R10.84 PAIN, ABDOMINAL, GENERALIZED: Status: ACTIVE | Noted: 2020-06-19

## 2020-06-19 PROBLEM — R91.8 PULMONARY NODULES: Status: ACTIVE | Noted: 2020-06-19

## 2020-06-19 PROCEDURE — 99214 OFFICE O/P EST MOD 30 MIN: CPT | Performed by: INTERNAL MEDICINE

## 2020-06-19 RX ORDER — ASCORBIC ACID 500 MG
TABLET ORAL
COMMUNITY
End: 2020-11-20

## 2020-06-19 RX ORDER — NIACIN 500 MG
TABLET ORAL
COMMUNITY
End: 2020-11-20

## 2020-06-19 RX ORDER — DICYCLOMINE HYDROCHLORIDE 10 MG/1
CAPSULE ORAL
COMMUNITY
Start: 2020-06-15 | End: 2020-11-20

## 2020-06-19 RX ORDER — METRONIDAZOLE 500 MG/1
TABLET ORAL
COMMUNITY
Start: 2020-06-14 | End: 2020-07-27 | Stop reason: ALTCHOICE

## 2020-06-19 RX ORDER — LISINOPRIL 10 MG/1
10 TABLET ORAL DAILY
Qty: 30 TABLET | Refills: 2 | Status: SHIPPED
Start: 2020-06-19 | End: 2020-09-18 | Stop reason: SDUPTHER

## 2020-06-19 RX ORDER — CIPROFLOXACIN 750 MG/1
TABLET, FILM COATED ORAL
COMMUNITY
Start: 2020-06-14 | End: 2020-07-27 | Stop reason: ALTCHOICE

## 2020-06-19 RX ORDER — METOPROLOL SUCCINATE 25 MG/1
25 TABLET, EXTENDED RELEASE ORAL DAILY
Qty: 30 TABLET | Refills: 0 | Status: SHIPPED | OUTPATIENT
Start: 2020-06-19 | End: 2020-08-24

## 2020-06-19 ASSESSMENT — ENCOUNTER SYMPTOMS
EYE DISCHARGE: 0
RHINORRHEA: 0
COUGH: 0
BLOOD IN STOOL: 0
CHEST TIGHTNESS: 0
ABDOMINAL PAIN: 1
NAUSEA: 0
WHEEZING: 0
SINUS PAIN: 0
SORE THROAT: 0
FLATUS: 1
SHORTNESS OF BREATH: 0
TROUBLE SWALLOWING: 0
DIARRHEA: 1
EYE PAIN: 0
VOMITING: 0
SINUS PRESSURE: 0

## 2020-06-19 NOTE — PROGRESS NOTES
2020     Blanka Fraser (: 1970) is a 44 y.o. male, here for evaluation of the following medical concerns:    Chief Complaint   Patient presents with    Follow-Up from Hospital     Patient wants to discuss labs and CT scan from [facility]         Camping Saturday--Friday--[employer]---Sub. Slushy red machine drink--funny feeling and then cheese coneys --caused it--cipro and metronidazole--partially taken metronidazole regularly twice a day with food and did not take Cipro after read out as it affects the muscles and he does physical work with furnace. I explained to him if his partner can do the lifting part he can be brain and then he can take Cipro as he still has diarrhea and abdominal pain is much better although. Urgent care --noon-xray--?diverticulitis. as he was getting worse that is why he went to [facility] hospital and got CT scan     Drinking ice water yesterday   night --[facility]ER extensive chart review done with the labs and CT scan. BFscrambled eggs mccallum--32 oz gatorade. Lunch pizza--3 big slices  Ice water--  Diarrhea  --       Anxiety--with his abdominal pain and anxiety coming back and he has been working with Exercise --running, gifteece. Explained to him at length that can happen as he has a nervous personality and with new sickness is worried about different things. Vit b12, niacin,magnesium etc. over-the-counter taken and I asked him to stop his apple cider vinegar as that can upset stomach. Hypertension    Watching low-sodium diet. Blood pressure 880 systolic in the ER. Taking lisinopril twice a day. Not taking blood pressure medications regularly. Urged him to get a blood pressure machine as soon as possible with arm cuff. Blood pressure checked off and on and trying to keep a goal of blood pressure less than 130/85 most of the time.    Denies any chest pain / palpitation / shortness of breath / lightheadedness etc.   Lab Due to this being a TeleHealth encounter (During UFXXO-14 public health emergency), evaluation of the following organ systems was limited: Vitals/Constitutional/EENT/Resp/CV/GI//MS/Neuro/Skin/Heme-Lymph-Imm. Pursuant to the emergency declaration under the [facility], [address] [place] [address] and the [employer] Act, this Virtual Visit was conducted with patient's (and/or legal guardian's) consent, to reduce the patient's risk of exposure to COVID-19 and provide necessary medical care. The patient (and/or legal guardian) has also been advised to contact this office for worsening conditions or problems, and seek emergency medical treatment and/or call 911 if deemed necessary. Patient identification was verified at the start of the visit: Yes    Total time spent for this encounter: 36 minutes and 46 seconds    Services were provided through a video synchronous discussion virtually to substitute for in-person clinic visit. Patient and provider were located at their individual homes. --Aparna Smith MD on 6/19/2020 at 10:44 AM    An electronic signature was used to authenticate this note. Return in about 1 week (around 6/26/2020) for Abdominal pain, blood pressure. Patient Instructions   Return virtual visit in 1 week. Finish metronidazole after food. Yogurt or probiotic at noon for 10 days. Plenty of fluids with diarrhea with electrolytes with apple, grape juice, Cran apple juice, Gatorade, water. Cipro try to take as he still has diarrhea tonight and over the weekend with no physical work to avoid any muscular injury. Keep avoiding spicy, acidic tomato based foods or fatty foods like chocolate, citrus fruits (oranges, grapefruit etc.) and fruit juices. Limit intake of coffee, tea, alcohol and Barbara to 1-2 a day after food only. Watch your weight.  Being overweight increases intra-abdominal pressure - which can aggravate heartburn or reflux. Don't gorge yourself at meal time. Eat smaller meals. Wait for 2 hours for exercise after eating. No eating or drinking for 3-4 hours before lying down. May elevate head of bed with blocks , if needed. Hypertension    Keep low sodium diet. Change lisinopril to 10 mg 1 a day in the morning. Add metoprolol succinate 25 mg every evening. Avoid potato chips, pretzels, sauerkraut , ham , sausage, mccallum , salty crackers , salty french fries, salty nuts, salty popcorn etc.  Use only low sodium soups. No salted canned vegetables. Use fresh or frozen vegetables. No salt shaker use. Avoid weight gain. Regular exercise program.  Keep taking blood pressure medications regularly. If blood pressure staying above 130/85 or having side effects with blood pressure medications, then bring the blood pressure and pulse recorded twice a day to an appointment sooner than scheduled one. Repeat CT scan chest in 6 months. Call if he wants to see a lung specialist.    Discussed use, benefit, and side effects of prescribed medications. Barriers to compliance discussed. All patient questions answered. Pt voiced understanding. IF YOU NEED A PRESCRIPTION REFILL, THEN PLEASE GIVE US THREE WORKING DAYS TO REFILL A PRESCRIPTION. Electronically signed by Joe Riley MD on 6/19/2020 at 10:44 AM     This dictation was generated by voice recognition computer software. Although all attempts are made to edit the dictation for accuracy, there may be errors in the transcription that are not intended.

## 2020-07-13 ENCOUNTER — TELEPHONE (OUTPATIENT)
Dept: PRIMARY CARE CLINIC | Age: 50
End: 2020-07-13

## 2020-07-13 NOTE — TELEPHONE ENCOUNTER
I spoke with Romayne Precise and he is having a lot of Aniety issues. Issues with is weight, BP, muscle pain in legs and back. He has also had a lot of tongue pain since September . He would like to go back on his anxiety medication which has has been off for about 30 yrs. He says with everything that's going on it's hard to deal with every . Please give him a call to suggest what he needs to do.

## 2020-07-13 NOTE — TELEPHONE ENCOUNTER
Can see a psychiatrist Dr Marval Collet [facility]. He needs to call them today.  I will put in a referral in EPIC

## 2020-07-27 ENCOUNTER — OFFICE VISIT (OUTPATIENT)
Dept: PRIMARY CARE CLINIC | Age: 50
End: 2020-07-27
Payer: COMMERCIAL

## 2020-07-27 VITALS
DIASTOLIC BLOOD PRESSURE: 80 MMHG | SYSTOLIC BLOOD PRESSURE: 142 MMHG | RESPIRATION RATE: 16 BRPM | HEART RATE: 113 BPM | OXYGEN SATURATION: 98 % | HEIGHT: 70 IN | BODY MASS INDEX: 29.49 KG/M2 | TEMPERATURE: 97.9 F | WEIGHT: 206 LBS

## 2020-07-27 PROBLEM — R20.2 NUMBNESS AND TINGLING IN RIGHT HAND: Status: ACTIVE | Noted: 2020-07-27

## 2020-07-27 PROBLEM — R19.7 DIARRHEA: Status: RESOLVED | Noted: 2020-06-19 | Resolved: 2020-07-27

## 2020-07-27 PROBLEM — M19.90 ARTHRITIS: Status: ACTIVE | Noted: 2020-07-27

## 2020-07-27 PROBLEM — E55.9 VITAMIN D DEFICIENCY: Status: ACTIVE | Noted: 2020-07-27

## 2020-07-27 PROBLEM — M54.50 ACUTE BILATERAL LOW BACK PAIN WITHOUT SCIATICA: Status: ACTIVE | Noted: 2020-07-27

## 2020-07-27 PROBLEM — R10.84 PAIN, ABDOMINAL, GENERALIZED: Status: RESOLVED | Noted: 2020-06-19 | Resolved: 2020-07-27

## 2020-07-27 PROBLEM — R20.0 NUMBNESS AND TINGLING IN RIGHT HAND: Status: ACTIVE | Noted: 2020-07-27

## 2020-07-27 PROBLEM — M79.10 MYALGIA: Status: ACTIVE | Noted: 2020-07-27

## 2020-07-27 PROCEDURE — 99214 OFFICE O/P EST MOD 30 MIN: CPT | Performed by: INTERNAL MEDICINE

## 2020-07-27 PROCEDURE — G8427 DOCREV CUR MEDS BY ELIG CLIN: HCPCS | Performed by: INTERNAL MEDICINE

## 2020-07-27 PROCEDURE — 1036F TOBACCO NON-USER: CPT | Performed by: INTERNAL MEDICINE

## 2020-07-27 PROCEDURE — G8417 CALC BMI ABV UP PARAM F/U: HCPCS | Performed by: INTERNAL MEDICINE

## 2020-07-27 PROCEDURE — 3017F COLORECTAL CA SCREEN DOC REV: CPT | Performed by: INTERNAL MEDICINE

## 2020-07-27 ASSESSMENT — ENCOUNTER SYMPTOMS
ABDOMINAL PAIN: 0
SORE THROAT: 0
TROUBLE SWALLOWING: 0
SINUS PRESSURE: 0
VOMITING: 0
CHEST TIGHTNESS: 0
COUGH: 0
WHEEZING: 0
BLOOD IN STOOL: 0
EYE PAIN: 0
NAUSEA: 0
EYE DISCHARGE: 0
BACK PAIN: 1
RHINORRHEA: 0
SHORTNESS OF BREATH: 0
SINUS PAIN: 0

## 2020-07-27 NOTE — PROGRESS NOTES
2020     Cris Cohwdhury (: 1970) is a 44 y.o. male, here for evaluation of the following medical concerns:    Chief Complaint   Patient presents with    1 Month Follow-Up        Hypertension    Watching low-sodium diet. Taking blood pressure medications regularly. He has not started metoprolol yet from . That is not good explained at length that metoprolol was supposed to help with anxiety 2. Today he is still taking lisinopril only. Blood pressure elevated today not good either. Blood pressure checked off and on and trying to keep a goal of blood pressure less than 130/85 most of the time. Denies any chest pain / palpitation / shortness of breath / lightheadedness etc.   Lab Results       Component                Value               Date                       NA                       138                 2020                 K                        5.0                 2020                 CL                       97                  2020                 CO2                      28                  2020                 BUN                      17                  2020                 CREATININE               1.1                 2020                 GLUCOSE                  106                 2020                 CALCIUM                  9.6                 2020               back stiff  And myalgia    Hyperlipidemia    he has been watching low fat diet. Reminded to keep doing regular exercise 3 to 4 days a week. Must keep trying to lose weight.    he has been tolerating cholesterol medications without any side effects.     Lab Results       Component                Value               Date                       CHOL                     225 (H)             2020                 TRIG                     197 (H)             2020                 HDL                      40                  2020                 ALT Father 72        stents    Stroke Neg Hx         Vitals:    07/27/20 1641   BP: (!) 142/80   Site: Left Upper Arm   Pulse: 113   Resp: 16   Temp: 97.9 °F (36.6 °C)   SpO2: 98%   Weight: 206 lb (93.4 kg)   Height: 5' 10\" (1.778 m)     Estimated body mass index is 29.56 kg/m² as calculated from the following:    Height as of this encounter: 5' 10\" (1.778 m). Weight as of this encounter: 206 lb (93.4 kg). Physical Exam  Vitals signs and nursing note reviewed. Constitutional:       General: He is not in acute distress. HENT:      Head: Normocephalic and atraumatic. Right Ear: External ear normal.      Left Ear: External ear normal.   Eyes:      General: Lids are normal.      Conjunctiva/sclera: Conjunctivae normal.      Pupils: Pupils are equal, round, and reactive to light. Neck:      Musculoskeletal: Neck supple. Thyroid: No thyromegaly. Vascular: No JVD. Trachea: No tracheal deviation. Cardiovascular:      Rate and Rhythm: Normal rate and regular rhythm. Heart sounds: Normal heart sounds. No gallop. Pulmonary:      Effort: Pulmonary effort is normal. No respiratory distress. Breath sounds: Normal breath sounds. No wheezing or rales. Abdominal:      General: Bowel sounds are normal.      Palpations: Abdomen is soft. There is no mass. Tenderness: There is no abdominal tenderness. Musculoskeletal:         General: No tenderness. Comments: No leg edema or calf tenderness    Muscle pains all over the back with muscle tightening. Arthritis probably in the back and the hands also in the elbows with all the physical work. Lymphadenopathy:      Cervical: No cervical adenopathy. Skin:     General: Skin is warm and dry. Findings: No rash. Neurological:      Mental Status: He is alert and oriented to person, place, and time. Cranial Nerves: No cranial nerve deficit. Sensory: No sensory deficit.       Comments: Numbness kind of feeling in the fourth

## 2020-07-27 NOTE — PATIENT INSTRUCTIONS
Fasting blood work this week. Start metoprolol 25 in the evening and may decrease lisinopril to half a tablet if blood pressure staying less than 130/85. Hypertension    Keep low sodium diet. Avoid potato chips, pretzels, sauerkraut , ham , sausage, mccallum , salty crackers , salty french fries, salty nuts, salty popcorn etc.  Use only low sodium soups. No salted canned vegetables. Use fresh or frozen vegetables. No salt shaker use. Avoid weight gain. Regular exercise program.  Keep taking blood pressure medications regularly. If blood pressure staying above 130/85 or having side effects with blood pressure medications, then bring the blood pressure and pulse recorded twice a day to an appointment sooner than scheduled one. No caffeine. Vit d same. Vit b12. See Dr Violet Jeff for numbness in the hands. Keep Dr. Woody Crabtree group follow-up. CT chest for pulmonary nodules follow-up. Discussed use, benefit, and side effects of prescribed medications. Barriers to compliance discussed. All patient questions answered. Pt voiced understanding. IF YOU NEED A PRESCRIPTION REFILL, THEN PLEASE GIVE US THREE WORKING DAYS TO REFILL A PRESCRIPTION.

## 2020-07-29 ENCOUNTER — TELEPHONE (OUTPATIENT)
Dept: PRIMARY CARE CLINIC | Age: 50
End: 2020-07-29

## 2020-07-29 NOTE — TELEPHONE ENCOUNTER
Maybe he slept certain way. Keep BP and pulse record twice a day and bring to next appointment. Keep same meds.

## 2020-07-29 NOTE — TELEPHONE ENCOUNTER
I spoke with Gillian Vila and he saw Dr. Pricilla Nick on Monday 7/27/20 and he prescribed Metoprolol 25mg for BP. [facility] started taking it on Monday and felt fine on Tuesday but woke up this morning and his fingers felt numb and he had some tingling in his face. He said it took about 10 min. When he straighten out his arms to get his fingers to stop feeling numb. I asked if he is keeping hydrated and he stated that eaton is pretty much all he drinks. He is wanted to know if this is a side effect and to be instructed on what he should do. Please give him a call at [phone].

## 2020-07-30 DIAGNOSIS — R20.0 NUMBNESS AND TINGLING IN RIGHT HAND: ICD-10-CM

## 2020-07-30 DIAGNOSIS — R35.1 FREQUENT URINATION AT NIGHT: ICD-10-CM

## 2020-07-30 DIAGNOSIS — M79.10 MYALGIA: ICD-10-CM

## 2020-07-30 DIAGNOSIS — R73.09 IMPAIRED GLUCOSE METABOLISM: ICD-10-CM

## 2020-07-30 DIAGNOSIS — F41.9 ANXIETY: ICD-10-CM

## 2020-07-30 DIAGNOSIS — R91.8 PULMONARY NODULES: ICD-10-CM

## 2020-07-30 DIAGNOSIS — E78.00 PURE HYPERCHOLESTEROLEMIA: ICD-10-CM

## 2020-07-30 DIAGNOSIS — R20.2 NUMBNESS AND TINGLING IN RIGHT HAND: ICD-10-CM

## 2020-07-30 DIAGNOSIS — E55.9 VITAMIN D DEFICIENCY: ICD-10-CM

## 2020-07-30 DIAGNOSIS — M19.90 ARTHRITIS: ICD-10-CM

## 2020-07-30 LAB
A/G RATIO: 1.8 (ref 1.1–2.2)
ALBUMIN SERPL-MCNC: 4.3 G/DL (ref 3.4–5)
ALP BLD-CCNC: 58 U/L (ref 40–129)
ALT SERPL-CCNC: 23 U/L (ref 10–40)
ANION GAP SERPL CALCULATED.3IONS-SCNC: 13 MMOL/L (ref 3–16)
AST SERPL-CCNC: 21 U/L (ref 15–37)
BASOPHILS ABSOLUTE: 0 K/UL (ref 0–0.2)
BASOPHILS RELATIVE PERCENT: 0.5 %
BILIRUB SERPL-MCNC: 0.5 MG/DL (ref 0–1)
BILIRUBIN URINE: NEGATIVE
BLOOD, URINE: NEGATIVE
BUN BLDV-MCNC: 18 MG/DL (ref 7–20)
CALCIUM SERPL-MCNC: 9.5 MG/DL (ref 8.3–10.6)
CHLORIDE BLD-SCNC: 104 MMOL/L (ref 99–110)
CHOLESTEROL, TOTAL: 228 MG/DL (ref 0–199)
CLARITY: CLEAR
CO2: 26 MMOL/L (ref 21–32)
COLOR: YELLOW
CREAT SERPL-MCNC: 1 MG/DL (ref 0.9–1.3)
EOSINOPHILS ABSOLUTE: 0 K/UL (ref 0–0.6)
EOSINOPHILS RELATIVE PERCENT: 0.6 %
ESTIMATED AVERAGE GLUCOSE: 108.3 MG/DL
FOLATE: 4.41 NG/ML (ref 4.78–24.2)
GFR AFRICAN AMERICAN: >60
GFR NON-AFRICAN AMERICAN: >60
GLOBULIN: 2.4 G/DL
GLUCOSE BLD-MCNC: 102 MG/DL (ref 70–99)
GLUCOSE URINE: NEGATIVE MG/DL
HBA1C MFR BLD: 5.4 %
HCT VFR BLD CALC: 47.7 % (ref 40.5–52.5)
HDLC SERPL-MCNC: 42 MG/DL (ref 40–60)
HEMOGLOBIN: 16.1 G/DL (ref 13.5–17.5)
KETONES, URINE: NEGATIVE MG/DL
LDL CHOLESTEROL CALCULATED: 161 MG/DL
LEUKOCYTE ESTERASE, URINE: NEGATIVE
LYMPHOCYTES ABSOLUTE: 2.7 K/UL (ref 1–5.1)
LYMPHOCYTES RELATIVE PERCENT: 34.9 %
MAGNESIUM: 2.2 MG/DL (ref 1.8–2.4)
MCH RBC QN AUTO: 30.5 PG (ref 26–34)
MCHC RBC AUTO-ENTMCNC: 33.7 G/DL (ref 31–36)
MCV RBC AUTO: 90.7 FL (ref 80–100)
MICROSCOPIC EXAMINATION: NORMAL
MONOCYTES ABSOLUTE: 0.6 K/UL (ref 0–1.3)
MONOCYTES RELATIVE PERCENT: 8 %
NEUTROPHILS ABSOLUTE: 4.4 K/UL (ref 1.7–7.7)
NEUTROPHILS RELATIVE PERCENT: 56 %
NITRITE, URINE: NEGATIVE
PDW BLD-RTO: 14.4 % (ref 12.4–15.4)
PH UA: 5.5 (ref 5–8)
PLATELET # BLD: 201 K/UL (ref 135–450)
PMV BLD AUTO: 9.6 FL (ref 5–10.5)
POTASSIUM SERPL-SCNC: 4.7 MMOL/L (ref 3.5–5.1)
PROSTATE SPECIFIC ANTIGEN: 3.72 NG/ML (ref 0–4)
PROTEIN UA: NEGATIVE MG/DL
RBC # BLD: 5.26 M/UL (ref 4.2–5.9)
RHEUMATOID FACTOR: <10 IU/ML
SEDIMENTATION RATE, ERYTHROCYTE: 3 MM/HR (ref 0–20)
SODIUM BLD-SCNC: 143 MMOL/L (ref 136–145)
SPECIFIC GRAVITY UA: 1.02 (ref 1–1.03)
T4 FREE: 1.3 NG/DL (ref 0.9–1.8)
TOTAL CK: 276 U/L (ref 39–308)
TOTAL PROTEIN: 6.7 G/DL (ref 6.4–8.2)
TRIGL SERPL-MCNC: 124 MG/DL (ref 0–150)
TSH REFLEX: 0.9 UIU/ML (ref 0.27–4.2)
URINE REFLEX TO CULTURE: NORMAL
URINE TYPE: NORMAL
UROBILINOGEN, URINE: 0.2 E.U./DL
VITAMIN B-12: >2000 PG/ML (ref 211–911)
VITAMIN D 25-HYDROXY: 37 NG/ML
VLDLC SERPL CALC-MCNC: 25 MG/DL
WBC # BLD: 7.8 K/UL (ref 4–11)

## 2020-07-31 LAB — ANTI-NUCLEAR ANTIBODY (ANA): NEGATIVE

## 2020-08-03 ENCOUNTER — VIRTUAL VISIT (OUTPATIENT)
Dept: PRIMARY CARE CLINIC | Age: 50
End: 2020-08-03
Payer: COMMERCIAL

## 2020-08-03 PROBLEM — E53.8 FOLIC ACID DEFICIENCY: Status: ACTIVE | Noted: 2020-08-03

## 2020-08-03 PROBLEM — M62.81 MUSCLE WEAKNESS OF RIGHT UPPER EXTREMITY: Status: ACTIVE | Noted: 2020-08-03

## 2020-08-03 PROCEDURE — 99213 OFFICE O/P EST LOW 20 MIN: CPT | Performed by: INTERNAL MEDICINE

## 2020-08-03 RX ORDER — FOLIC ACID 1 MG/1
1 TABLET ORAL DAILY
Qty: 30 TABLET | Refills: 3 | Status: SHIPPED | OUTPATIENT
Start: 2020-08-03 | End: 2020-12-14 | Stop reason: SDUPTHER

## 2020-08-03 ASSESSMENT — ENCOUNTER SYMPTOMS
TROUBLE SWALLOWING: 0
SHORTNESS OF BREATH: 0
VOMITING: 0
CHEST TIGHTNESS: 0
ABDOMINAL PAIN: 0
SORE THROAT: 0
WHEEZING: 0
SINUS PAIN: 0
EYE PAIN: 0
RHINORRHEA: 0
EYE DISCHARGE: 0
BLOOD IN STOOL: 0
COUGH: 0
SINUS PRESSURE: 0
NAUSEA: 0

## 2020-08-03 NOTE — PATIENT INSTRUCTIONS
one.  Discussed use, benefit, and side effects of prescribed medications. Barriers to compliance discussed. All patient questions answered. Pt voiced understanding. IF YOU NEED A PRESCRIPTION REFILL, THEN PLEASE GIVE US THREE WORKING DAYS TO REFILL A PRESCRIPTION.

## 2020-08-03 NOTE — PROGRESS NOTES
8/3/2020     Sugey June (: 1970) is a 44 y.o. male, here for evaluation of the following medical concerns:    Chief Complaint   Patient presents with    Discuss Medications     started prozac friday and new bp med last monday. trouble with arms and lifting weights. Arthritis, myalgia-all labs reviewed with him with negative for rheumatoid arthritis and negative for lupus arthritis and sed rate being normal and CBC being normal and urinalysis only concentrated urine and no infection or blood and PSA being normal.  Vitamin B12 thyroids are normal and folic acid is low and he will need to take that folic acid lifelong. R arm triceps weakness with lifting 25 lb. Pushups weak R arm. No R arm numbness. Called Dr Clive Peres earliest appointment in October. He is seeing Dr. Yusuf Croft neurologist last year and high asked him to call him and if he can give a sooner appointment then we do a referral for him. He could check with his insurance the neurologist online and he can call me with a neurologist ,who  is in his network and we can get a referral for that neurologist as soon as possible. He definitely has to see a neurologist. He  does have some pain in the neck off and on but not any worsening noted lately. Does have pain in the shoulder at times. 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 Hypertension    Watching low-sodium diet. Blood pressure one time was woke up to 150s. He definitely to increase lisinopril if he sees that can do number and increased to 10 mg every day. Taking blood pressure medications regularly. 134/84/66  Blood pressure checked off and on and trying to keep a goal of blood pressure less than 130/85 most of the time.    Denies any chest pain / palpitation / shortness of breath / lightheadedness etc.   Lab Results       Component                Value               Date                       NA 143                 07/30/2020                 K                        4.7                 07/30/2020                 CL                       104                 07/30/2020                 CO2                      26                  07/30/2020                 BUN                      18                  07/30/2020                 CREATININE               1.0                 07/30/2020                 GLUCOSE                  102                 07/30/2020                 CALCIUM                  9.5                 07/30/2020                Anxiety    Taking medications regularly. 10 mg prozac by Dr Roberta Delgadillo. He keeps on reading side effects of Prozac and explained to him that he is not helping his anxiety by reading up on all the information on [employer] and explained to him people who do better with medication they do not put anything out on the computer so a lot of people are help with medication but he needs to work on himself with self relaxation part 2. Without any side effects from medications. Reminded to keep regular exercise program.  Reminded to keep self relaxation with music or meditation etc.    Impaired glucose regulation:    Reminded to avoid sweets. Trying to eat lean meats ,low fat, low-carb diet. Trying to eat  least bread, potato, pasta. Trying to lose weight to ideal body weight as discussed. Explained that ,regular exercise 4 days a week very important. Keep trying to do all of the above to avoid diabetes mellitus and its complications. Hyperlipidemia    he has been watching low fat diet. Reminded to keep doing regular exercise 3 to 4 days a week. Must keep trying to lose weight.    he has been tolerating cholesterol medications without any side effects.     Lab Results-I explained to him he still not working on low-fat diet that well despite the medication his LDL is elevated and it is going to put him at risk for heart attack and stroke and death etc. Component                Value               Date                       CHOL                     228 (H)             07/30/2020                 TRIG                     124                 07/30/2020                 HDL                      42                  07/30/2020                 ALT                      23                  07/30/2020                 AST                      21                  07/30/2020                Review of Systems   Constitutional: Negative for appetite change, chills, fever and unexpected weight change. HENT: Negative for congestion, ear discharge, ear pain, nosebleeds, rhinorrhea, sinus pressure, sinus pain, sore throat and trouble swallowing. Eyes: Negative for pain and discharge. Respiratory: Negative for cough, chest tightness, shortness of breath and wheezing. Cardiovascular: Negative for chest pain, palpitations and leg swelling. Gastrointestinal: Negative for abdominal pain, blood in stool, nausea and vomiting. Endocrine: Negative for polydipsia and polyphagia. Genitourinary: Negative for difficulty urinating, enuresis, flank pain and hematuria. Musculoskeletal: Positive for myalgias. Skin: Negative for rash. Neurological: Positive for weakness. Negative for facial asymmetry, light-headedness, numbness and headaches. Psychiatric/Behavioral: Negative for confusion. The patient is nervous/anxious.         Current Outpatient Medications on File Prior to Visit   Medication Sig Dispense Refill    vitamin C (ASCORBIC ACID) 500 MG tablet Take by mouth      cyanocobalamin ([employer]S VITAMIN B12) 1000 MCG tablet Take by mouth      niacin 500 MG tablet Take by mouth      dicyclomine (BENTYL) 10 MG capsule       lisinopril (PRINIVIL;ZESTRIL) 10 MG tablet Take 1 tablet by mouth daily With breakfast 30 tablet 2    metoprolol succinate (TOPROL XL) 25 MG extended release tablet Take 1 tablet by mouth daily With supper 30 tablet 0    pravastatin (PRAVACHOL) 20 MG tablet Take 1 tablet by mouth every 48 hours With supper (Patient not taking: Reported on 6/19/2020) 30 tablet 0    fluticasone (FLONASE) 50 MCG/ACT nasal spray 1 spray by Each Nostril route 2 times daily as needed for Rhinitis 1 Bottle 0    Misc Natural Products (TURMERIC CURCUMIN) CAPS Take 1 tablet by mouth daily      APPLE CIDER VINEGAR PO Take 2 capsules by mouth daily       No current facility-administered medications on file prior to visit. Past Medical History:   Diagnosis Date    Anxiety     Hypertension       Social History     Tobacco Use    Smoking status: Former Smoker    Smokeless tobacco: Former User   Substance Use Topics    Alcohol use: Yes     Frequency: Monthly or less     Comment: seldom      Family History   Problem Relation Age of Onset    Heart Disease Mother 61        stents    Heart Disease Father 72        stents    Stroke Neg Hx         There were no vitals filed for this visit. Estimated body mass index is 29.56 kg/m² as calculated from the following:    Height as of 7/27/20: 5' 10\" (1.778 m). Weight as of 7/27/20: 206 lb (93.4 kg). Physical Exam  Constitutional:       Appearance: Normal appearance. HENT:      Nose: Nose normal.   Neck:      Musculoskeletal: Normal range of motion. Neurological:      Mental Status: He is alert and oriented to person, place, and time. Comments: Weak right triceps   Psychiatric:         Mood and Affect: Mood normal.         Behavior: Behavior normal.         Thought Content: Thought content normal.         Judgment: Judgment normal.         ASSESSMENT/PLAN:  1. Muscle weakness of right upper extremity  Call to see neurologist as soon as possible. 2. Folic acid deficiency  Must take lifelong  - folic acid (FOLVITE) 1 MG tablet; Take 1 tablet by mouth daily  Dispense: 30 tablet; Refill: 3    3. Essential hypertension  Low-sodium diet better and may adjust lisinopril If blood pressure higher than 262 systolic    4.  Pure hypercholesterolemia  Low-fat diet better with pravastatin. 5. Impaired glucose metabolism  Must limit sweets and  Avoid weight gain. 6. Anxiety  Keep psychiatrist follow-up and let medication work  Shae Morse is a 44 y.o. male being evaluated by a Virtual Visit (video visit) encounter to address concerns as mentioned above. A caregiver was present when appropriate. Due to this being a TeleHealth encounter (During HHSPS-14 public health emergency), evaluation of the following organ systems was limited: Vitals/Constitutional/EENT/Resp/CV/GI//MS/Neuro/Skin/Heme-Lymph-Imm. Pursuant to the emergency declaration under the [address] [place] [address], [address] [place] [address] authority and the [employer] Act, this Virtual Visit was conducted with patient's (and/or legal guardian's) consent, to reduce the patient's risk of exposure to COVID-19 and provide necessary medical care. The patient (and/or legal guardian) has also been advised to contact this office for worsening conditions or problems, and seek emergency medical treatment and/or call 911 if deemed necessary. Patient identification was verified at the start of the visit: Yes    Total time spent for this encounter: 22 minutes and 56 seconds    Services were provided through a video synchronous discussion virtually to substitute for in-person clinic visit. Patient and provider were located at their individual homes. --Madiha Lao MD on 8/3/2020 at 11:18 AM    An electronic signature was used to authenticate this note. Return in about 2 weeks (around 8/17/2020) for blood pressure, arm muscles. Patient Instructions   Folic acid 1 mg everyday. Still waiting on his lung scan for the pulmonary nodule follow-up. Keep same follow-up on August 17, 2020. Must keep exercising with the lower weights of maybe 5pounds for triceps and arm exercises.       Must call Dr. Na Carlin neurologist or in network neurologist as soon as possible for right triceps weakness. See how folic acid does with right arm weakness. Keep vitamin B12 and vitamin D same. I urged him to not read up on glucose that much and making himself more nervous and let medication help him. He must work on blood pressure control better and cholesterol control better as well as anxiety part to avoid a heart attack or stroke or death etc.    Keep taking medications Prozac regularly. See Dr. Everardo Stokes for further management. Regular exercise also helps anxiety and must use Vivo josue for breathing exercises to relax himself. Mindfulness mediation or Yoga will help too. Hyperlipidemia    Keep low fat diet. Keep trying to get to a healthy weight. Keep regular exercise program.  Keep taking cholesterol lowering medication regularly. Impaired glucose regulation:    Avoid sweets. Try to eat lean meats ,low fat, low-carb diet. Try to eat  least bread, potato, pasta. Keep trying to lose weight to ideal body weight as discussed. Regular exercise 4 days a week very important. Hypertension    Keep low sodium diet. Avoid potato chips, pretzels, sauerkraut , ham , sausage, mccallum , salty crackers , salty french fries, salty nuts, salty popcorn etc.  Use only low sodium soups. No salted canned vegetables. Use fresh or frozen vegetables. No salt shaker use. Avoid weight gain. Regular exercise program.  Keep taking blood pressure medications regularly. If blood pressure staying above 130/85 or having side effects with blood pressure medications, then bring the blood pressure and pulse recorded twice a day to an appointment sooner than scheduled one. Discussed use, benefit, and side effects of prescribed medications. Barriers to compliance discussed. All patient questions answered. Pt voiced understanding.   IF YOU NEED A PRESCRIPTION REFILL, THEN PLEASE GIVE US THREE WORKING DAYS TO REFILL A PRESCRIPTION. Electronically signed by Nataly Giles MD on 8/3/2020 at 11:18 AM     This dictation was generated by voice recognition computer software. Although all attempts are made to edit the dictation for accuracy, there may be errors in the transcription that are not intended.

## 2020-08-07 ENCOUNTER — TELEPHONE (OUTPATIENT)
Dept: PRIMARY CARE CLINIC | Age: 50
End: 2020-08-07

## 2020-08-10 ENCOUNTER — HOSPITAL ENCOUNTER (OUTPATIENT)
Dept: CT IMAGING | Age: 50
Discharge: HOME | End: 2020-08-10
Payer: COMMERCIAL

## 2020-08-10 PROCEDURE — 71250 CT THORAX DX C-: CPT

## 2020-08-17 ENCOUNTER — OFFICE VISIT (OUTPATIENT)
Dept: PRIMARY CARE CLINIC | Age: 50
End: 2020-08-17
Payer: COMMERCIAL

## 2020-08-17 VITALS
OXYGEN SATURATION: 98 % | HEART RATE: 80 BPM | SYSTOLIC BLOOD PRESSURE: 120 MMHG | RESPIRATION RATE: 16 BRPM | TEMPERATURE: 98.3 F | BODY MASS INDEX: 28.8 KG/M2 | HEIGHT: 70 IN | WEIGHT: 201.2 LBS | DIASTOLIC BLOOD PRESSURE: 72 MMHG

## 2020-08-17 PROBLEM — M19.90 ARTHRITIS: Status: RESOLVED | Noted: 2020-07-27 | Resolved: 2020-08-17

## 2020-08-17 PROBLEM — M48.02 DEGENERATIVE CERVICAL SPINAL STENOSIS: Status: ACTIVE | Noted: 2020-08-17

## 2020-08-17 PROBLEM — M54.50 ACUTE BILATERAL LOW BACK PAIN WITHOUT SCIATICA: Status: RESOLVED | Noted: 2020-07-27 | Resolved: 2020-08-17

## 2020-08-17 PROCEDURE — 3017F COLORECTAL CA SCREEN DOC REV: CPT | Performed by: INTERNAL MEDICINE

## 2020-08-17 PROCEDURE — G8427 DOCREV CUR MEDS BY ELIG CLIN: HCPCS | Performed by: INTERNAL MEDICINE

## 2020-08-17 PROCEDURE — 99213 OFFICE O/P EST LOW 20 MIN: CPT | Performed by: INTERNAL MEDICINE

## 2020-08-17 PROCEDURE — 1036F TOBACCO NON-USER: CPT | Performed by: INTERNAL MEDICINE

## 2020-08-17 PROCEDURE — G8417 CALC BMI ABV UP PARAM F/U: HCPCS | Performed by: INTERNAL MEDICINE

## 2020-08-17 ASSESSMENT — ENCOUNTER SYMPTOMS
ABDOMINAL PAIN: 0
SINUS PRESSURE: 0
NAUSEA: 0
EYE PAIN: 0
SINUS PAIN: 0
COUGH: 0
CHEST TIGHTNESS: 0
RHINORRHEA: 0
SHORTNESS OF BREATH: 0
BLOOD IN STOOL: 0
EYE DISCHARGE: 0
TROUBLE SWALLOWING: 0
WHEEZING: 0
SORE THROAT: 0
VOMITING: 0

## 2020-08-17 NOTE — PROGRESS NOTES
2020     Sammi Schrader (: 1970) is a 44 y.o. male, here for evaluation of the following medical concerns:    Chief Complaint   Patient presents with    Results        r arm weakness--he saw Dr. Beth Edmond and he has follow-up again in 2 weeks. R arm weak triceps. CT cervical spine reviewed from 2018 in [facility] and explained to him he has C4-C7 all level degenerative disc disease and spinal stenosis both sides and he does have pain on the right cervicothoracic area and that is what is causing his weakness in the triceps muscle and probably with his physical work is straining on that area and muscle spasm causing pressure on the nerve part explained to him at length and he needs to call Dr. Roshan Sterling for further injection locally or further treatment or further testing--he understood that I explained to him at length and I printed referral for him again. Folic acid def--has been taking that regularly. Hypertension    Watching low-sodium diet. Taking blood pressure medications regularly. Blood pressure checked off and on and trying to keep a goal of blood pressure less than 130/85 most of the time.    Denies any chest pain / palpitation / shortness of breath / lightheadedness etc.   Lab Results       Component                Value               Date                       NA                       143                 2020                 K                        4.7                 2020                 CL                       104                 2020                 CO2                      26                  2020                 BUN                      18                  2020                 CREATININE               1.0                 2020                 GLUCOSE                  102                 2020                 CALCIUM                  9.5                 2020                Hyperlipidemia    he has been watching low fat diet. Reminded to keep doing regular exercise 3 to 4 days a week. Must keep trying to lose weight.    he has been tolerating cholesterol medications without any side effects. Lab Results       Component                Value               Date                       CHOL                     228 (H)             07/30/2020                 TRIG                     124                 07/30/2020                 HDL                      42                  07/30/2020                 ALT                      23                  07/30/2020                 AST                      21                  07/30/2020              For depression anxiety he saw Dr. Dipak Wallis psychiatrist and he gave him Prozac and he took for 3 days and it had bad thoughts and he stopped Prozac and Dr. Dipak Wallis tried lamotrigine and the lamotrigine he took a few days and it did not help and he was put back on Klonopin and he is taking Klonopin as needed and going to discuss further treatment with psychiatrist.    CT chest reviewed with him lung nodule stable. Review of Systems   Constitutional: Negative for appetite change, chills, fever and unexpected weight change. HENT: Negative for congestion, ear discharge, ear pain, nosebleeds, rhinorrhea, sinus pressure, sinus pain, sore throat and trouble swallowing. Eyes: Negative for pain and discharge. Respiratory: Negative for cough, chest tightness, shortness of breath and wheezing. Cardiovascular: Negative for chest pain, palpitations and leg swelling. Gastrointestinal: Negative for abdominal pain, blood in stool, nausea and vomiting. Endocrine: Negative for polydipsia and polyphagia. Genitourinary: Negative for difficulty urinating, enuresis, flank pain and hematuria. Musculoskeletal: Positive for myalgias and neck pain. Skin: Negative for rash. Neurological: Positive for weakness.  Negative for facial asymmetry, light-headedness, numbness and headaches. Psychiatric/Behavioral: Negative for confusion. The patient is nervous/anxious. Current Outpatient Medications on File Prior to Visit   Medication Sig Dispense Refill    folic acid (FOLVITE) 1 MG tablet Take 1 tablet by mouth daily 30 tablet 3    vitamin C (ASCORBIC ACID) 500 MG tablet Take by mouth      cyanocobalamin (CVS VITAMIN B12) 1000 MCG tablet Take by mouth      niacin 500 MG tablet Take by mouth      dicyclomine (BENTYL) 10 MG capsule       lisinopril (PRINIVIL;ZESTRIL) 10 MG tablet Take 1 tablet by mouth daily With breakfast 30 tablet 2    metoprolol succinate (TOPROL XL) 25 MG extended release tablet Take 1 tablet by mouth daily With supper 30 tablet 0    pravastatin (PRAVACHOL) 20 MG tablet Take 1 tablet by mouth every 48 hours With supper (Patient not taking: Reported on 6/19/2020) 30 tablet 0    fluticasone (FLONASE) 50 MCG/ACT nasal spray 1 spray by Each Nostril route 2 times daily as needed for Rhinitis 1 Bottle 0    Misc Natural Products (TURMERIC CURCUMIN) CAPS Take 1 tablet by mouth daily      APPLE CIDER VINEGAR PO Take 2 capsules by mouth daily       No current facility-administered medications on file prior to visit.        Past Medical History:   Diagnosis Date    Anxiety     Hypertension       Social History     Tobacco Use    Smoking status: Former Smoker    Smokeless tobacco: Former User   Substance Use Topics    Alcohol use: Yes     Frequency: Monthly or less     Comment: seldom      Family History   Problem Relation Age of Onset    Heart Disease Mother 61        stents    Heart Disease Father 72        stents    Stroke Neg Hx         Vitals:    08/17/20 1514   BP: 120/72   Site: Left Upper Arm   Position: Sitting   Cuff Size: Large Adult   Pulse: 80   Resp: 16   Temp: 98.3 °F (36.8 °C)   SpO2: 98%   Weight: 201 lb 3.2 oz (91.3 kg)   Height: 5' 10\" (1.778 m)     Estimated body mass index is 28.87 kg/m² as calculated from the following:    Height as of this encounter: 5' 10\" (1.778 m). Weight as of this encounter: 201 lb 3.2 oz (91.3 kg). Physical Exam  Vitals signs and nursing note reviewed. Constitutional:       General: He is not in acute distress. HENT:      Head: Normocephalic and atraumatic. Right Ear: External ear normal.      Left Ear: External ear normal.   Eyes:      General: Lids are normal.      Conjunctiva/sclera: Conjunctivae normal.      Pupils: Pupils are equal, round, and reactive to light. Neck:      Musculoskeletal: Neck supple. Thyroid: No thyromegaly. Vascular: No JVD. Trachea: No tracheal deviation. Cardiovascular:      Rate and Rhythm: Normal rate and regular rhythm. Heart sounds: Normal heart sounds. No gallop. Pulmonary:      Effort: Pulmonary effort is normal. No respiratory distress. Breath sounds: Normal breath sounds. No wheezing or rales. Abdominal:      General: Bowel sounds are normal.      Palpations: Abdomen is soft. There is no mass. Tenderness: There is no abdominal tenderness. Musculoskeletal:         General: Tenderness (  C6-T1 right paraspinal muscle spasm and tender.) present. Comments: No leg edema or calf tenderness    Right triceps muscle weakness whenever he is putting weight on the right arm. Lymphadenopathy:      Cervical: No cervical adenopathy. Skin:     General: Skin is warm and dry. Findings: No rash. Neurological:      Mental Status: He is alert and oriented to person, place, and time. Cranial Nerves: No cranial nerve deficit. Sensory: No sensory deficit. Psychiatric:         Attention and Perception: Attention and perception normal.         Mood and Affect: Mood is anxious. Speech: Speech normal.         Behavior: Behavior normal.         Thought Content: Thought content normal.         Cognition and Memory: Cognition and memory normal.         Judgment: Judgment normal.         ASSESSMENT/PLAN:  1.  Folic acid deficiency  Folic acid  - FOLATE; Future    2. Muscle weakness of right upper extremity  Dr. Ida Kraus follow-up in Dr. Duncan Stone follow-up    3. Degenerative cervical spinal stenosis  Dr. Duncan Stone follow-up    4. Pulmonary nodules  Recheck in 6 months watch only on emphysema as CT of the lungs noted and reviewed with him at length. 5. Essential hypertension    - Basic Metabolic Panel; Future    6. Pure hypercholesterolemia    - CK; Future  - Hepatic Function Panel; Future  - Lipid Panel; Future      Return in about 12 weeks (around 11/9/2020) for high cholesterol, blood pressure. Patient Instructions   Keep folic acid every day. Keep with Dr. Ida Kraus follow-up. Call Dr. Duncan Stone for cervical spinal stenosis and she can recommend further treatment for that and further work-up if needed. Hypertension    Keep low sodium diet. Avoid potato chips, pretzels, sauerkraut , ham , sausage, mccallum , salty crackers , salty french fries, salty nuts, salty popcorn etc.  Use only low sodium soups. No salted canned vegetables. Use fresh or frozen vegetables. No salt shaker use. Avoid weight gain. Regular exercise program.  Keep taking blood pressure medications regularly. If blood pressure staying above 130/85 or having side effects with blood pressure medications, then bring the blood pressure and pulse recorded twice a day to an appointment sooner than scheduled one. Hyperlipidemia    Keep low fat diet. Keep trying to get to a healthy weight. Keep regular exercise program.  Keep taking cholesterol lowering medication regularly. Electronically signed by Sharifa iBswas MD on 8/17/2020 at 4:23 PM     This dictation was generated by voice recognition computer software. Although all attempts are made to edit the dictation for accuracy, there may be errors in the transcription that are not intended.

## 2020-08-17 NOTE — PATIENT INSTRUCTIONS
Keep folic acid every day. Keep with Dr. Leonela Aceves follow-up. Call Dr. Judge Herrera for cervical spinal stenosis and she can recommend further treatment for that and further work-up if needed. Hypertension    Keep low sodium diet. Avoid potato chips, pretzels, sauerkraut , ham , sausage, mccallum , salty crackers , salty french fries, salty nuts, salty popcorn etc.  Use only low sodium soups. No salted canned vegetables. Use fresh or frozen vegetables. No salt shaker use. Avoid weight gain. Regular exercise program.  Keep taking blood pressure medications regularly. If blood pressure staying above 130/85 or having side effects with blood pressure medications, then bring the blood pressure and pulse recorded twice a day to an appointment sooner than scheduled one. Hyperlipidemia    Keep low fat diet. Keep trying to get to a healthy weight. Keep regular exercise program.  Keep taking cholesterol lowering medication regularly.

## 2020-08-24 RX ORDER — METOPROLOL SUCCINATE 25 MG/1
TABLET, EXTENDED RELEASE ORAL
Qty: 30 TABLET | Refills: 0 | Status: SHIPPED | OUTPATIENT
Start: 2020-08-24 | End: 2020-09-17

## 2020-09-18 RX ORDER — LISINOPRIL 10 MG/1
10 TABLET ORAL DAILY
Qty: 90 TABLET | Refills: 1 | Status: SHIPPED | OUTPATIENT
Start: 2020-09-18 | End: 2020-11-20

## 2020-09-18 RX ORDER — METOPROLOL SUCCINATE 25 MG/1
TABLET, EXTENDED RELEASE ORAL
Qty: 30 TABLET | Refills: 2 | Status: SHIPPED | OUTPATIENT
Start: 2020-09-18 | End: 2020-12-14 | Stop reason: SDUPTHER

## 2020-10-19 ENCOUNTER — TELEPHONE (OUTPATIENT)
Dept: PRIMARY CARE CLINIC | Age: 50
End: 2020-10-19

## 2020-10-19 NOTE — TELEPHONE ENCOUNTER
Patient called back and wanted to see if Dr. Nina Bernal received his first message. The patient did state that today he worked out and his heart rate was 100 and his blood pressure was 110/80 but whenever he wakes up it is high.  He thinks  There is a possibility that he may have sleep apnea like his father

## 2020-10-19 NOTE — TELEPHONE ENCOUNTER
Spoke with pt he stated he is upset that he has waited all day to be told to schedule an appointment, I was successful and got him scheduled as a VV as he couldn't do an in person visit.

## 2020-10-20 ENCOUNTER — VIRTUAL VISIT (OUTPATIENT)
Dept: PRIMARY CARE CLINIC | Age: 50
End: 2020-10-20
Payer: COMMERCIAL

## 2020-10-20 PROCEDURE — 99213 OFFICE O/P EST LOW 20 MIN: CPT | Performed by: INTERNAL MEDICINE

## 2020-10-20 ASSESSMENT — ENCOUNTER SYMPTOMS
SINUS PRESSURE: 0
SORE THROAT: 0
ABDOMINAL PAIN: 0
TROUBLE SWALLOWING: 0
COUGH: 0
RHINORRHEA: 0
WHEEZING: 0
BLOOD IN STOOL: 0
EYE PAIN: 0
EYE DISCHARGE: 0
SINUS PAIN: 0
VOMITING: 0
NAUSEA: 0
CHEST TIGHTNESS: 0
SHORTNESS OF BREATH: 0

## 2020-10-20 NOTE — PATIENT INSTRUCTIONS
Hypertension    Extra lisinopril half a tablet in the evening if blood pressure stays elevated in the morning more than 140/90. Definitely need to watch the salt in the chili or the food what ever he has eaten the night before when the blood pressure is elevated. Self relaxation to help with the nerves causing blood pressure increase. Keep low sodium diet. Avoid potato chips, pretzels, sauerkraut , ham , sausage, mccallum , salty crackers , salty french fries, salty nuts, salty popcorn etc.  Use only low sodium soups. No salted canned vegetables. Use fresh or frozen vegetables. No salt shaker use. Avoid weight gain. Regular exercise program.  Keep taking blood pressure medications regularly. If blood pressure staying above 130/85 or having side effects with blood pressure medications, then bring the blood pressure and pulse recorded twice a day to an appointment sooner than scheduled one. Keep CBT f/u    Head space josue    Watch neck with lifting. Self relaxation with meditation and yoga. Discussed use, benefit, and side effects of prescribed medications. Barriers to compliance discussed. All patient questions answered. Pt voiced understanding. IF YOU NEED A PRESCRIPTION REFILL, THEN PLEASE GIVE US THREE WORKING DAYS TO REFILL A PRESCRIPTION.

## 2020-10-20 NOTE — PROGRESS NOTES
10/20/2020     Ankita Hermosillo (: 1970) is a 44 y.o. male, here for evaluation of the following medical concerns:    Chief Complaint   Patient presents with    Hypertension     160/100 in the mornings        Sleep apnea--she does not hear him snore. Not stopped breathing. slept 10 pm --woke up at 4 am. Not tired in daytime. Cx disc disease with Arm--pain--not seen specialist yet. Arm strength back--fingers feeling better with folic acid and Z35.-MCBLWM stenosis will be with him and explained to him depends on what kind of work he does or how he bends his neck and that symptoms in the arm will come back so he has to prevent reinjury and so does not have to see the specialist until the symptoms come back. Psych tried klonopin--did not help. He has tried Lamictal and other medication and he just wants to be without medication he wants to try CBT and no medication and he is using headspace josue as well as self relaxation part and explained to him that be fine if he cannot control himself with anxiety part because that could be contributing to blood pressure. Hypertension    Yesterday 5 pm 120/80---118/83/95; resting pilse 60s. Watching low-sodium diet. yesterday am 160/100--did not recheck in other arm--deer chillie night before--no crackers. Either gingerale/water. Lunch oatmeal.  Last night-tacos-tomatos/corn meat salsa  Taking blood pressure medications regularly. Blood pressure checked off and on and trying to keep a goal of blood pressure less than 130/85 most of the time.    Denies any chest pain / palpitation / shortness of breath / lightheadedness etc.   Lab Results       Component                Value               Date                       NA                       143                 2020                 K                        4.7                 2020                 CL                       104                 2020                 CO2                      26 07/30/2020                 BUN                      18                  07/30/2020                 CREATININE               1.0                 07/30/2020                 GLUCOSE                  102                 07/30/2020                 CALCIUM                  9.5                 07/30/2020                Hyperlipidemia    he has been watching low fat diet. Reminded to keep doing regular exercise 3 to 4 days a week. Must keep trying to lose weight.    he has been tolerating cholesterol medications without any side effects. Lab Results       Component                Value               Date                       CHOL                     228 (H)             07/30/2020                 TRIG                     124                 07/30/2020                 HDL                      42                  07/30/2020                 ALT                      23                  07/30/2020                 AST                      21                  07/30/2020                Review of Systems   Constitutional: Negative for appetite change, chills, fever and unexpected weight change. HENT: Negative for congestion, ear discharge, ear pain, nosebleeds, rhinorrhea, sinus pressure, sinus pain, sore throat and trouble swallowing. Eyes: Negative for pain and discharge. Respiratory: Negative for cough, chest tightness, shortness of breath and wheezing. Cardiovascular: Negative for chest pain, palpitations and leg swelling. Gastrointestinal: Negative for abdominal pain, blood in stool, nausea and vomiting. Endocrine: Negative for polydipsia and polyphagia. Genitourinary: Negative for difficulty urinating, enuresis, flank pain and hematuria. Musculoskeletal: Negative for myalgias. Skin: Negative for rash. Neurological: Negative for facial asymmetry, weakness, light-headedness, numbness and headaches. Psychiatric/Behavioral: Negative for confusion.        Current Outpatient Medications on

## 2020-11-20 ENCOUNTER — TELEPHONE (OUTPATIENT)
Dept: PRIMARY CARE CLINIC | Age: 50
End: 2020-11-20

## 2020-11-20 ENCOUNTER — OFFICE VISIT (OUTPATIENT)
Dept: CARDIOLOGY CLINIC | Age: 50
End: 2020-11-20
Payer: COMMERCIAL

## 2020-11-20 VITALS
DIASTOLIC BLOOD PRESSURE: 80 MMHG | HEART RATE: 97 BPM | OXYGEN SATURATION: 98 % | BODY MASS INDEX: 29.63 KG/M2 | WEIGHT: 207 LBS | SYSTOLIC BLOOD PRESSURE: 124 MMHG | HEIGHT: 70 IN

## 2020-11-20 PROCEDURE — G8484 FLU IMMUNIZE NO ADMIN: HCPCS | Performed by: NURSE PRACTITIONER

## 2020-11-20 PROCEDURE — 1036F TOBACCO NON-USER: CPT | Performed by: NURSE PRACTITIONER

## 2020-11-20 PROCEDURE — 93000 ELECTROCARDIOGRAM COMPLETE: CPT | Performed by: NURSE PRACTITIONER

## 2020-11-20 PROCEDURE — G8417 CALC BMI ABV UP PARAM F/U: HCPCS | Performed by: NURSE PRACTITIONER

## 2020-11-20 PROCEDURE — 99213 OFFICE O/P EST LOW 20 MIN: CPT | Performed by: NURSE PRACTITIONER

## 2020-11-20 PROCEDURE — 3017F COLORECTAL CA SCREEN DOC REV: CPT | Performed by: NURSE PRACTITIONER

## 2020-11-20 PROCEDURE — G8427 DOCREV CUR MEDS BY ELIG CLIN: HCPCS | Performed by: NURSE PRACTITIONER

## 2020-11-20 RX ORDER — LISINOPRIL 20 MG/1
20 TABLET ORAL EVERY EVENING
Qty: 90 TABLET | Refills: 1 | Status: SHIPPED | OUTPATIENT
Start: 2020-11-20 | End: 2021-02-15

## 2020-11-20 RX ORDER — ACETAMINOPHEN 160 MG
TABLET,DISINTEGRATING ORAL
COMMUNITY
End: 2022-03-15

## 2020-11-20 RX ORDER — MAGNESIUM GLUCONATE 27 MG(500)
500 TABLET ORAL 2 TIMES DAILY
COMMUNITY
End: 2020-12-16

## 2020-11-20 NOTE — PROGRESS NOTES
[address]     Outpatient Follow Up Note    Yahir White is 44 y.o. male who presents today with a history of non-cardiac chest pain and HTN. CHIEF COMPLAINT / HPI:  Follow Up secondary to HTN    Subjective:     Two weeks ago, he had pain in his left arm and chest. He has a bone in the center of his chest and when he fiddles with it, it hurts. His BP cuff also hurts his Lt arm    His BP this morning was near 160/100. Its been on the high side the past few days and mostly in the morning. Last week it was ~ 107/74; yesterday 120-130/90. He has no SOB. The patient denies orthopnea/PND. He wakes every couple of hours. His mind races about his BP being high. The patient does not have swelling. The patients weight is stable . The patient has palpitations which he attributes to anxiety and possible caffeine. He has no dizziness. These symptoms show no change since the last OV. With regard to medication therapy the patient has been compliant with prescribed regimen. They have tolerated therapy to date.      Past Medical History:   Diagnosis Date    Anxiety     Hypertension      Social History:    Social History     Tobacco Use   Smoking Status Former Smoker   Smokeless Tobacco Former User     Current Medications:  Current Outpatient Medications   Medication Sig Dispense Refill    Cholecalciferol (VITAMIN D3) 50 MCG (2000 UT) CAPS Take by mouth      magnesium gluconate (MAGONATE) 500 MG tablet Take 500 mg by mouth 2 times daily      metoprolol succinate (TOPROL XL) 25 MG extended release tablet TAKE 1 TABLET BY MOUTH EVERY DAY WITH SUPPER (Patient taking differently: Take 25 mg by mouth daily ) 30 tablet 2    lisinopril (PRINIVIL;ZESTRIL) 10 MG tablet Take 1 tablet by mouth daily With breakfast (Patient taking differently: Take 10 mg by mouth every evening ) 90 tablet 1    folic acid (FOLVITE) 1 MG tablet Take 1 tablet by mouth daily 30 tablet 3    APPLE CIDER VINEGAR PO Take 2 capsules by mouth daily       No current facility-administered medications for this visit. REVIEW OF SYSTEMS:    CONSTITUTIONAL: No major weight gain or loss, fatigue, weakness, night sweats or fever. HEENT: No new vision difficulties or ringing in the ears. RESPIRATORY: No new SOB, PND, orthopnea or cough. CARDIOVASCULAR: See HPI  GI: No nausea, vomiting, diarrhea, constipation, abdominal pain or changes in bowel habits. : No urinary frequency, urgency, incontinence hematuria or dysuria. SKIN: No cyanosis or skin lesions. MUSCULOSKELETAL: No new muscle or joint pain. NEUROLOGICAL: No syncope or TIA-like symptoms. PSYCHIATRIC: + anxiety; - pain, insomnia or depression    Objective:   PHYSICAL EXAM:       Vitals:    11/20/20 1336 11/20/20 1401   BP: 130/70 124/80   Site: Left Upper Arm Left Upper Arm   Position: Sitting    Cuff Size: Large Adult Large Adult   Pulse: 97    SpO2: 98%    Weight: 207 lb (93.9 kg)    Height: 5' 10\" (1.778 m)         VITALS:  /70 (Site: Left Upper Arm, Position: Sitting, Cuff Size: Large Adult)   Pulse 97   Ht 5' 10\" (1.778 m)   Wt 207 lb (93.9 kg)   SpO2 98%   BMI 29.70 kg/m²   CONSTITUTIONAL: Cooperative, no apparent distress, and appears well nourished / developed  NEUROLOGIC:  Awake and orientated to person, place and time. PSYCH: Calm affect. SKIN: Warm and dry. HEENT: Sclera non-icteric, normocephalic, neck supple, no elevation of JVP, normal carotid pulses with no bruits and thyroid normal size. LUNGS:  No increased work of breathing and clear to auscultation, no crackles or wheezing  CARDIOVASCULAR:  Regular rate 94 and rhythm with no murmurs, gallops, rubs, or abnormal heart sounds, normal PMI. The apical impulses not displaced  JVP less than 8 cm H2O  Heart tones are crisp and normal  Cervical veins are not engorged  The carotid upstroke is normal in amplitude and contour without delay or bruit  JVP is not elevated  ABDOMEN:  Normal bowel sounds, Termination: Target heart rate      Results      Echo (rest): Normal (LVEF >50%)      Echo (stress): Hyperkinetic (LVEF >70%)      Echo   Baseline resting echocardiogram shows normal global LV systolic function   with an ejection fraction of 55-60% and uniform myocardial segmental wall   motion. Following stress there was uniform augmentation of all myocardial   segments with appropriate hyperdynamic LV systolic response to stress.      ECG   Normal (Negative) response to exercise.      Symptoms   No symptoms with exercise.     Echo: Summary   -Normal left ventricle size, wall thickness and systolic function with an   estimated ejection fraction of 55-60%.  -No regional wall motion abnormalities are seen.   -Normal diastolic function. E/e=6.97   -Trivial pulmonic and mitral regurgitation present.       Assessment:      Diagnosis Orders   1. Essential hypertension   ~controlled in office today  ~reports high readings in am  ~takes lisinopril in pm, metoprolol in am    2. Mixed hyperlipidemia   ~LDL elevated  ~no longer taking fish oil nor niacin   ~taking apple cider vinegar    3. Other chest pain   ~atypical discomfort possibly from manual manipulation   ~neg stress echo '18  ~exercises routinely without discomfort        I had the opportunity to review the clinical symptoms and presentation of Minerva Roach. Plan:     1. Increase lisinopril to 20 mg every evening for AM coverage  2. F/U in 1 month as scheduled    ~ask to bring home cuff in for comparison      Overall the patient is stable from CV standpoint    I have addresed the patient's cardiac risk factors and adjusted pharmacologic treatment as needed. In addition, I have reinforced the need for patient directed risk factor modification. Further evaluation will be based upon the patient's clinical course and testing results. All questions and concerns were addressed to the patient. Alternatives to my treatment were discussed.       The patient is not currently smoking. The risks related to smoking were reviewed with the patient. Recommend maintaining a smoke-free lifestyle. Patient is not on a beta-blocker : neg MI  Patient is on an ace-i/ARB  Patient is not on a statin : stopped taking fish oil and niacin     Dual Antiplatelet therapy / anti-coagulation has not been recommended / prescribed for this patient. The patient verbalizes understanding not to stop medications without discussing with us. Discussed exercise: 30-60 minutes 7 days/week  Discussed diet. Thank you for allowing to us to participate in the care of [address] [place] [address].     DICKSON Diaz    Documentation of today's visit sent to PCP >> has no PCP

## 2020-11-20 NOTE — PATIENT INSTRUCTIONS
Increase lisinopril to 20 mg every evening to help cover morning BP rise    Bring your cuff in with you when you see Dr. Shasha Rod in one month

## 2020-11-20 NOTE — TELEPHONE ENCOUNTER
Patient called b/c his blood pressure has been recording all over the spectrum. He takes  metoprolol succinate (TOPROL XL) 25 MG extended release tablet in the morning and lisinopril (PRINIVIL;ZESTRIL) 10 MG tablet at night. The opposite of what is in his chart. The patient said Dr. Felipe Foote knows he takes them that way. Patient said he can feel when his bp is low and when it is high. He also has anxiety and he said with his bp being all over the place it is giving him anxiety. He would like someone to call and help him figure out what he can do to get it under controll.

## 2020-12-09 ENCOUNTER — TELEPHONE (OUTPATIENT)
Dept: PRIMARY CARE CLINIC | Age: 50
End: 2020-12-09

## 2020-12-14 RX ORDER — METOPROLOL SUCCINATE 25 MG/1
25 TABLET, EXTENDED RELEASE ORAL DAILY
Qty: 30 TABLET | Refills: 0 | Status: SHIPPED | OUTPATIENT
Start: 2020-12-14 | End: 2021-01-27 | Stop reason: ALTCHOICE

## 2020-12-14 RX ORDER — FOLIC ACID 1 MG/1
1 TABLET ORAL DAILY
Qty: 30 TABLET | Refills: 0 | Status: SHIPPED | OUTPATIENT
Start: 2020-12-14 | End: 2021-07-14

## 2020-12-14 NOTE — TELEPHONE ENCOUNTER
I received 2 faxes from [facility] prescription refill request for Magali Guallpa, Folic acid 1 MG &Metoprolol ER Succinate 25 MG. I scanned them into media.

## 2020-12-15 NOTE — TELEPHONE ENCOUNTER
Pt last ov was 10/20/20 and was due to return 11/9/20, called pt to schedule prior to submitting refills, lmom for him to call back and schedule.

## 2020-12-16 ENCOUNTER — OFFICE VISIT (OUTPATIENT)
Dept: CARDIOLOGY CLINIC | Age: 50
End: 2020-12-16
Payer: COMMERCIAL

## 2020-12-16 ENCOUNTER — TELEPHONE (OUTPATIENT)
Dept: CARDIOLOGY CLINIC | Age: 50
End: 2020-12-16

## 2020-12-16 VITALS
DIASTOLIC BLOOD PRESSURE: 83 MMHG | BODY MASS INDEX: 30.24 KG/M2 | OXYGEN SATURATION: 99 % | TEMPERATURE: 97.3 F | HEIGHT: 70 IN | HEART RATE: 86 BPM | SYSTOLIC BLOOD PRESSURE: 120 MMHG | WEIGHT: 211.2 LBS

## 2020-12-16 PROCEDURE — 3017F COLORECTAL CA SCREEN DOC REV: CPT | Performed by: INTERNAL MEDICINE

## 2020-12-16 PROCEDURE — G8417 CALC BMI ABV UP PARAM F/U: HCPCS | Performed by: INTERNAL MEDICINE

## 2020-12-16 PROCEDURE — G8427 DOCREV CUR MEDS BY ELIG CLIN: HCPCS | Performed by: INTERNAL MEDICINE

## 2020-12-16 PROCEDURE — G8484 FLU IMMUNIZE NO ADMIN: HCPCS | Performed by: INTERNAL MEDICINE

## 2020-12-16 PROCEDURE — 1036F TOBACCO NON-USER: CPT | Performed by: INTERNAL MEDICINE

## 2020-12-16 PROCEDURE — 99214 OFFICE O/P EST MOD 30 MIN: CPT | Performed by: INTERNAL MEDICINE

## 2020-12-16 RX ORDER — UBIDECARENONE 75 MG
50 CAPSULE ORAL DAILY
COMMUNITY
End: 2022-04-18

## 2020-12-16 NOTE — PATIENT INSTRUCTIONS
Recommend Omron  Arm Blood Pressure Cuff     Your Cuff in office read 148/101  Our check 140/ 80       The office will call to schedule the stress test and Calcium score or you can [phone]     Take the metoprolol succinate (TOPROL XL) 25 MG prior to your stress test

## 2020-12-16 NOTE — PROGRESS NOTES
[address]   Cardiac Follow Up     Referring Provider:  Latoya Barry MD     Chief Complaint   Patient presents with    Chest Pain    Follow-up    Hypertension        History of Present Illness:  Jose Clemens is a 44 y.o. male with a history of non-cardiac chest pain and HTN. He has arm and neck pain at rest. This is unchanged since his last visit. His BP cuff hurts his arm. He is active, works out regularly without symptoms. He is anxious about BP being high. He is working to control his anxiety without medications, he states he does not like to take pills. His Cuff in office reads 148/101  Recheck by me 140/ 80      Past Medical History:   has a past medical history of Anxiety and Hypertension. Surgical History:   has a past surgical history that includes Vasectomy; Carpal tunnel release; Tonsillectomy; Colonoscopy (07/2018); and Upper gastrointestinal endoscopy (07/2018). Social History:   reports that he has quit smoking. He has quit using smokeless tobacco. He reports previous alcohol use. He reports that he does not use drugs. Family History:  family history includes Heart Disease (age of onset: 61) in his mother; Heart Disease (age of onset: 72) in his father. Home Medications:  Prior to Admission medications    Medication Sig Start Date End Date Taking?  Authorizing Provider   vitamin B-12 (CYANOCOBALAMIN) 100 MCG tablet Take 50 mcg by mouth daily   Yes Historical Provider, MD   metoprolol succinate (TOPROL XL) 25 MG extended release tablet Take 1 tablet by mouth daily 12/14/20  Yes Ba Wolfe MD   folic acid (FOLVITE) 1 MG tablet Take 1 tablet by mouth daily 12/14/20  Yes aSmi Arcos MD   Cholecalciferol (VITAMIN D3) 50 MCG (2000 UT) CAPS Take by mouth   Yes Historical Provider, MD   lisinopril (PRINIVIL;ZESTRIL) 20 MG tablet Take 1 tablet by mouth every evening With breakfast 11/20/20  Yes Giselle Mulligan, APRN - CNP        Allergies: Caffeine and Betamethasone     Review of Systems:   · Constitutional: there has been no unanticipated weight loss. There's been no change in energy level, sleep pattern, or activity level. · Eyes: No visual changes or diplopia. No scleral icterus. · ENT: No Headaches, hearing loss or vertigo. No mouth sores or sore throat. · Cardiovascular: Reviewed in HPI  · Respiratory: No cough or wheezing, no sputum production. No hematemesis. · Gastrointestinal: No abdominal pain, appetite loss, blood in stools. No change in bowel or bladder habits. · Genitourinary: No dysuria, trouble voiding, or hematuria. · Musculoskeletal:  No gait disturbance, weakness or joint complaints. · Integumentary: No rash or pruritis. · Neurological: No headache, diplopia, change in muscle strength, numbness or tingling. No change in gait, balance, coordination, mood, affect, memory, mentation, behavior. · Psychiatric: No anxiety, no depression. · Endocrine: No malaise, fatigue or temperature intolerance. No excessive thirst, fluid intake, or urination. No tremor. · Hematologic/Lymphatic: No abnormal bruising or bleeding, blood clots or swollen lymph nodes. · Allergic/Immunologic: No nasal congestion or hives. Physical Examination:    Vitals:    12/16/20 1307   BP: 120/83   Pulse: 86   Temp: 97.3 °F (36.3 °C)   SpO2: 99%        Wt Readings from Last 1 Encounters:   12/16/20 211 lb 3.2 oz (95.8 kg)       Constitutional and General Appearance:   Respiratory:  · Normal excursion and expansion without use of accessory muscles  · Resp Auscultation: Normal breath sounds without dullness  Cardiovascular:  · The apical impulses not displaced  · Heart tones are crisp and normal  · Cervical veins are not engorged  · The carotid upstroke is normal in amplitude and contour without delay or bruit  · Peripheral pulses are symmetrical and full  · There is no clubbing, cyanosis of the extremities.   · No edema  · Femoral Arteries: 2+ and equal · Pedal Pulses: 2+ and equal   Abdomen:  · No masses or tenderness  · Liver/Spleen: No Abnormalities Noted  Neurological/Psychiatric:  · Alert and oriented in all spheres  · Moves all extremities well  · Exhibits normal gait balance and coordination  · No abnormalities of mood, affect, memory, mentation, or behavior are noted      Assessment/Plan:    1. Hypertension - Blood pressure 120/83, pulse 86, temperature 97.3 °F (36.3 °C), height 5' 10\" (1.778 m), weight 211 lb 3.2 oz (95.8 kg), SpO2 99 %. Recommend Omron  Arm Blood Pressure Cuff   Your Cuff in office read 148/101  Our check 140/80   -controlled in office today     2. Hyperlipidemia - plan for CT calcium score- if elevated will need statins    3. Chest pain-atypical discomfort -exercises routinely without discomfort- plan for stress testing             Plan:  Antonia Peres has a stable cardiac status. Cardiac test and lab results personally reviewed by me during this office visit and discussed. No med changes. CT calcium score- if elevated will need statin   Stress Echo- Take  metoprolol succinate (TOPROL XL) 25 MG prior to your stress test   Continue risk factor modifications. Call for any change in symptoms, call to report any changes in shortness of breath or development of chest pain with activity. Return for regular follow up in 1 months. I appreciate the opportunity of cooperating in the care of this individual.    Comfort Roque. Keely Schmitt M.D., [facility]    Patient's problem list, medications, allergies, past medical, surgical, social and family histories were reviewed and updated as appropriate. Scribe's attestation: This note was scribed in the presence of Dr. Keely Schmitt MD, by Reese Licona RN. The scribe's documentation has been prepared under my direction and personally reviewed by me in its entirety. I confirm that the note above accurately reflects all work, treatment, procedures, and medical decision making performed by me.

## 2021-01-04 ENCOUNTER — PROCEDURE VISIT (OUTPATIENT)
Dept: CARDIOLOGY CLINIC | Age: 51
End: 2021-01-04
Payer: COMMERCIAL

## 2021-01-04 DIAGNOSIS — R07.9 CHEST PAIN, UNSPECIFIED TYPE: ICD-10-CM

## 2021-01-04 DIAGNOSIS — E78.5 HYPERLIPIDEMIA, UNSPECIFIED HYPERLIPIDEMIA TYPE: Primary | ICD-10-CM

## 2021-01-04 DIAGNOSIS — R06.02 SOB (SHORTNESS OF BREATH): ICD-10-CM

## 2021-01-04 LAB
LV EF: 50 %
LVEF MODALITY: NORMAL

## 2021-01-04 PROCEDURE — 93325 DOPPLER ECHO COLOR FLOW MAPG: CPT | Performed by: INTERNAL MEDICINE

## 2021-01-04 PROCEDURE — 93320 DOPPLER ECHO COMPLETE: CPT | Performed by: INTERNAL MEDICINE

## 2021-01-04 PROCEDURE — 93351 STRESS TTE COMPLETE: CPT | Performed by: INTERNAL MEDICINE

## 2021-01-12 NOTE — PROGRESS NOTES
[address]   Cardiac Follow Up     Referring Provider:  Ina Goodell, MD     Chief Complaint   Patient presents with    1 Month Follow-Up    Hypertension        History of Present Illness:  Jessica Jones is a 44 y.o. male with a history of non-cardiac chest pain and HTN. He has not taken his Toprol XL this morning. He had run out, he also does not want to take multiple medications for his BP. He took the Metoprolol prior to his stress test. He reports he has had difficulty losing weight. He is active, works out regularly without symptoms. He is anxious about BP being high. He is working to control his anxiety without medications, he states he does not like to take pills. Past Medical History:   has a past medical history of Anxiety and Hypertension. Surgical History:   has a past surgical history that includes Vasectomy; Carpal tunnel release; Tonsillectomy; Colonoscopy (07/2018); and Upper gastrointestinal endoscopy (07/2018). Social History:   reports that he has quit smoking. He has quit using smokeless tobacco. He reports previous alcohol use. He reports that he does not use drugs. Family History:  family history includes Heart Disease (age of onset: 61) in his mother; Heart Disease (age of onset: 72) in his father. Home Medications:  Prior to Admission medications    Medication Sig Start Date End Date Taking?  Authorizing Provider   vitamin B-12 (CYANOCOBALAMIN) 100 MCG tablet Take 50 mcg by mouth daily   Yes Historical Provider, MD   folic acid (FOLVITE) 1 MG tablet Take 1 tablet by mouth daily 12/14/20  Yes Suresh Fischer MD   Cholecalciferol (VITAMIN D3) 50 MCG (2000 UT) CAPS Take by mouth   Yes Historical Provider, MD   lisinopril (PRINIVIL;ZESTRIL) 20 MG tablet Take 1 tablet by mouth every evening With breakfast 11/20/20  Yes Bran Nguyễn APRN - CNP   metoprolol succinate (TOPROL XL) 25 MG extended release tablet Take 1 tablet by mouth daily  Patient not taking: Reported on 1/27/2021 12/14/20   Ba Donis MD        Allergies:  Caffeine and Betamethasone     Review of Systems:   · Constitutional: there has been no unanticipated weight loss. There's been no change in energy level, sleep pattern, or activity level. · Eyes: No visual changes or diplopia. No scleral icterus. · ENT: No Headaches, hearing loss or vertigo. No mouth sores or sore throat. · Cardiovascular: Reviewed in HPI  · Respiratory: No cough or wheezing, no sputum production. No hematemesis. · Gastrointestinal: No abdominal pain, appetite loss, blood in stools. No change in bowel or bladder habits. · Genitourinary: No dysuria, trouble voiding, or hematuria. · Musculoskeletal:  No gait disturbance, weakness or joint complaints. · Integumentary: No rash or pruritis. · Neurological: No headache, diplopia, change in muscle strength, numbness or tingling. No change in gait, balance, coordination, mood, affect, memory, mentation, behavior. · Psychiatric: No anxiety, no depression. · Endocrine: No malaise, fatigue or temperature intolerance. No excessive thirst, fluid intake, or urination. No tremor. · Hematologic/Lymphatic: No abnormal bruising or bleeding, blood clots or swollen lymph nodes. · Allergic/Immunologic: No nasal congestion or hives.     Physical Examination:    Vitals:    01/27/21 1017   BP: (!) 140/86   Pulse: 106   SpO2: 99%        Wt Readings from Last 1 Encounters:   01/27/21 218 lb 3.2 oz (99 kg)       Constitutional and General Appearance:NAD  Skin:good turgor,intact without lesions  HEENT: EOMI ,normal  Neck:no JVD     Respiratory:  · Normal excursion and expansion without use of accessory muscles  · Resp Auscultation: Normal breath sounds without dullness  Cardiovascular:  · The apical impulses not displaced  · Heart tones are crisp and normal  · Cervical veins are not engorged  · The carotid upstroke is normal in amplitude and contour without delay or bruit  · Peripheral pulses are symmetrical and full  · There is no clubbing, cyanosis of the extremities. · No edema  · Femoral Arteries: 2+ and equal  · Pedal Pulses: 2+ and equal   Abdomen:  · No masses or tenderness  · Liver/Spleen: No Abnormalities Noted  Neurological/Psychiatric:  · Alert and oriented in all spheres  · Moves all extremities well  · Exhibits normal gait balance and coordination  · No abnormalities of mood, affect, memory, mentation, or behavior are noted      Assessment/Plan:    1. Hypertension - Blood pressure (!) 140/86, pulse 106, height 5' 10\" (1.778 m), weight 218 lb 3.2 oz (99 kg), SpO2 99 %. Stop metoprolol succinate (TOPROL XL) 25 MG extended release tablet   Start  Verapamil  120mg Nighly   Monitor your BP at home with these medication changes      2. Hyperlipidemia -CT calcium score previously ordered- not yet completed- if elevated will need statins    3. Chest pain-atypical discomfort -exercises routinely without discomfort- resolved     1/4/21>  Normal stress echocardiogram study. Plan:  Nas Rivera has a stable cardiac status. Cardiac test and lab results personally reviewed by me during this office visit and discussed. Stop metoprolol succinate (TOPROL XL) 25 MG extended release tablet   Start  Verapamil SR 120mg Nighly   Monitor your BP at home with these medication changes   Continue risk factor modifications. Call for any change in symptoms, call to report any changes in shortness of breath or development of chest pain with activity. Return for regular follow up in 6 months. I appreciate the opportunity of cooperating in the care of this individual.    Lili Sun. Joellen Garcia M.D., [facility] [place]    Patient's problem list, medications, allergies, past medical, surgical, social and family histories were reviewed and updated as appropriate. Scribe's attestation: This note was scribed in the presence of Dr. Joellen Garcia MD, by Katerine Allan RN.     The scribe's documentation has been prepared under my direction and personally reviewed by me in its entirety. I confirm that the note above accurately reflects all work, treatment, procedures, and medical decision making performed by me.

## 2021-01-25 ENCOUNTER — TELEPHONE (OUTPATIENT)
Dept: PRIMARY CARE CLINIC | Age: 51
End: 2021-01-25

## 2021-01-25 ENCOUNTER — TELEPHONE (OUTPATIENT)
Dept: CARDIOLOGY CLINIC | Age: 51
End: 2021-01-25

## 2021-01-25 NOTE — TELEPHONE ENCOUNTER
Dr. Asher Baez started him on Toprol 7/27/2020 to aid B/P control and help with anxiety (had been taken off Klonopin). I spoke to Clau Vickers. He said Dr. Asher Baez would not refill it until he came in for an josue't (refer to tel enc dated 1/25/2021). He took his last dose this morning; he is due for Lisinopril 20mg this evening. He would actually like to go down to just one B/P pill if possible as he \"does not like taking pills. \" His home B/P's running 120's/70-80's. He says he is not stressed but has anxiety at times. He spoke quickly while talking to me but was calm. He has OV with you 1/27/2021.

## 2021-01-25 NOTE — TELEPHONE ENCOUNTER
Patient called and was upset b/c he went to  a prescription for metoprolol succinate (TOPROL XL) 25 MG extended release tablet and the pharmacy told him the doctor would not fill that prescription until he came in the office to see him. The patient said he is supposed to be weaned off that medication and not stopped cold turkey. He said no one called him to let him know that or he would have set up an appointment. He said he would be happy to go on lisinopril but he can't just stop taking this medicine. He was asking to speak to the  b/c he is not happy.

## 2021-01-25 NOTE — TELEPHONE ENCOUNTER
He had follow-up with me in October and he was supposed to come back in November for follow-up. He did not keep any follow-up or reschedule any follow-up for December or January. We do not know what he is doing with his medications. He has been seeing cardiologist to adjust his medication so that is what we believed that he was getting all his prescriptions now according to the cardiologist recommendations. We would love to see him back in the office whenever he is ready to make an appointment and it would be prudent to ask cardiologist to fill the medication as they are changing it and seeing him in the office.

## 2021-01-25 NOTE — TELEPHONE ENCOUNTER
Medication Refill    Medication needing refilled: metoprolol succinate (TOPROL XL) 25 MG extended release tablet    Dosage of the medication:    How are you taking this medication (QD, BID, TID, QID, PRN): 1 tablet by mouth daily    30 or 90 day supply called in: 30     When will you run out of your medication: PT IS [address] are we sending the medication to?: [facility] [address], [address] - P [phone] - F [phone]

## 2021-01-26 ENCOUNTER — TELEPHONE (OUTPATIENT)
Dept: CARDIOLOGY CLINIC | Age: 51
End: 2021-01-26

## 2021-01-26 NOTE — TELEPHONE ENCOUNTER
Patient notified order was faxed to [facility] on [address] in [facility], verbalized understanding to call and schedule.

## 2021-01-26 NOTE — TELEPHONE ENCOUNTER
Pt calling he states he called awhile back to get an order for Calcium Score that LES wants him to do to be sent to [facility] in [facility] and he called there and they still don't have the order. Can this be sent so he can get this done? Pt has an appt with LES tomorrow.  Pls call to advise Thank you

## 2021-01-26 NOTE — TELEPHONE ENCOUNTER
Spoke with the patient and advised him of the message below per LES. Patient voiced understanding .  Call complete

## 2021-01-27 ENCOUNTER — OFFICE VISIT (OUTPATIENT)
Dept: CARDIOLOGY CLINIC | Age: 51
End: 2021-01-27
Payer: COMMERCIAL

## 2021-01-27 VITALS
SYSTOLIC BLOOD PRESSURE: 140 MMHG | OXYGEN SATURATION: 99 % | WEIGHT: 218.2 LBS | HEART RATE: 106 BPM | HEIGHT: 70 IN | BODY MASS INDEX: 31.24 KG/M2 | DIASTOLIC BLOOD PRESSURE: 86 MMHG

## 2021-01-27 DIAGNOSIS — E78.49 OTHER HYPERLIPIDEMIA: ICD-10-CM

## 2021-01-27 DIAGNOSIS — I10 ESSENTIAL HYPERTENSION: Primary | ICD-10-CM

## 2021-01-27 PROCEDURE — 99214 OFFICE O/P EST MOD 30 MIN: CPT | Performed by: INTERNAL MEDICINE

## 2021-01-27 NOTE — PATIENT INSTRUCTIONS
Stop metoprolol succinate (TOPROL XL) 25 MG extended release tablet     Start  Verapamil   calan SR 120mg Nighly

## 2021-02-15 RX ORDER — LISINOPRIL 20 MG/1
20 TABLET ORAL EVERY EVENING
Qty: 90 TABLET | Refills: 1 | Status: SHIPPED | OUTPATIENT
Start: 2021-02-15 | End: 2021-05-07

## 2021-05-07 ENCOUNTER — OFFICE VISIT (OUTPATIENT)
Dept: CARDIOLOGY CLINIC | Age: 51
End: 2021-05-07
Payer: COMMERCIAL

## 2021-05-07 VITALS
BODY MASS INDEX: 28.83 KG/M2 | OXYGEN SATURATION: 99 % | SYSTOLIC BLOOD PRESSURE: 122 MMHG | DIASTOLIC BLOOD PRESSURE: 84 MMHG | HEIGHT: 70 IN | WEIGHT: 201.4 LBS | HEART RATE: 80 BPM

## 2021-05-07 DIAGNOSIS — E78.2 MIXED HYPERLIPIDEMIA: ICD-10-CM

## 2021-05-07 DIAGNOSIS — I10 ESSENTIAL HYPERTENSION: Primary | ICD-10-CM

## 2021-05-07 PROCEDURE — 99213 OFFICE O/P EST LOW 20 MIN: CPT | Performed by: NURSE PRACTITIONER

## 2021-05-07 RX ORDER — LISINOPRIL 10 MG/1
10 TABLET ORAL EVERY EVENING
Qty: 90 TABLET | Refills: 1 | Status: SHIPPED | OUTPATIENT
Start: 2021-05-07 | End: 2021-07-14 | Stop reason: ALTCHOICE

## 2021-05-07 NOTE — PROGRESS NOTES
[address]     Outpatient Follow Up Note    Analia Graham is 46 y.o. male who presents today with a history of HTN and hyperlipidemia. CHIEF COMPLAINT / HPI:  Follow Up secondary to hypertension starting verapamil    Subjective:   He stopped taking his verapamil. He thinks coming off of benzo is what caused his BP to go up (~ 150/ ). Taking both lisinopril and verapamil was causing his BP to change. After 1-2 months, he started feeling light headed. His BP was 90/60. He stopped verapamil. He started feeling better inside and BP stayed extremely good: 110/70. He decided to stop lisinopril when BPs were ~120/80. However, he has had times when its higher and yesterday it was: 130/113 , 133/90 so he took a lisinopril. The past couple of days he's had chest discomfort feeling its in his bone. He can reproduce the pain when he presses on his breast plate and when working out. There is no SOB/HARRIS. The patient denies orthopnea/PND. The patient does not have swelling. The patients weight is stable . The patient is not experiencing palpitations or dizziness. He had light headedness taking both of his BP pills    These symptoms show no change since the last OV. With regard to medication therapy the patient has not been compliant with prescribed regimen. They have tolerated therapy to date.      Past Medical History:   Diagnosis Date    Anxiety     Hypertension      Social History:    Social History     Tobacco Use   Smoking Status Former Smoker   Smokeless Tobacco Former User     Current Medications:  Current Outpatient Medications   Medication Sig Dispense Refill    Ascorbic Acid (VITAMIN C) 500 MG/5ML LIQD Take by mouth      ZINC PO Take by mouth daily      lisinopril (PRINIVIL;ZESTRIL) 20 MG tablet TAKE 1 TABLET BY MOUTH EVERY EVENING WITH BREAKFAST 90 tablet 1    vitamin B-12 (CYANOCOBALAMIN) 100 MCG tablet Take 50 mcg by mouth daily      folic acid (FOLVITE) 1 MG tablet Take 1 tablet by mouth daily 30 tablet 0    Cholecalciferol (VITAMIN D3) 50 MCG (2000 UT) CAPS Take by mouth       No current facility-administered medications for this visit. REVIEW OF SYSTEMS:    CONSTITUTIONAL: No major weight gain or loss, fatigue, weakness, night sweats or fever. HEENT: No new vision difficulties or ringing in the ears. RESPIRATORY: No new SOB, PND, orthopnea or cough. CARDIOVASCULAR: See HPI  GI: No nausea, vomiting, diarrhea, constipation, abdominal pain or changes in bowel habits. : No urinary frequency, urgency, incontinence hematuria or dysuria. SKIN: No cyanosis or skin lesions. MUSCULOSKELETAL: No new muscle or joint pain. NEUROLOGICAL: No syncope or TIA-like symptoms. PSYCHIATRIC: No anxiety, pain, insomnia or depression    Objective:   PHYSICAL EXAM:        Vitals:    05/07/21 0911 05/07/21 0944   BP: (!) 110/90 122/84   Site: Right Upper Arm    Position: Sitting    Cuff Size: Large Adult    Pulse: 80    SpO2: 99%    Weight: 201 lb 6.4 oz (91.4 kg)    Height: 5' 10\" (1.778 m)        VITALS:  BP (!) 110/90 (Site: Right Upper Arm, Position: Sitting, Cuff Size: Large Adult)   Pulse 80   Ht 5' 10\" (1.778 m)   Wt 201 lb 6.4 oz (91.4 kg)   SpO2 99%   BMI 28.90 kg/m²   CONSTITUTIONAL: Cooperative, no apparent distress, and appears well nourished / developed  NEUROLOGIC:  Awake and orientated to person, place and time. PSYCH: Calm affect. SKIN: Warm and dry. HEENT: Sclera non-icteric, normocephalic, neck supple, no elevation of JVP, normal carotid pulses with no bruits and thyroid normal size. LUNGS:  No increased work of breathing and clear to auscultation, no crackles or wheezing  CARDIOVASCULAR:  Regular rate 80 and rhythm with no murmurs, gallops, rubs, or abnormal heart sounds, normal PMI. The apical impulses not displaced  JVP less than 8 cm H2O  Heart tones are crisp and normal  Cervical veins are not engorged  The carotid upstroke is normal in amplitude and contour Agatston Score = 0  Volume = 0   Left Circumflex:   Agatston Score = 0  Volume = 0   RCA:   Agatston Score = 0  Volume = 0   Other:   Agatston Score = 0  Volume = 0   Total Plaque [place]:   Agatston Score = 0  Volume = 0           Assessment:      Diagnosis Orders   1. Essential hypertension   ~controlled in office today  ~had taken lisinopril 20 mg yesterday for an elevated DBP  ~tends to self adjust meds  ~unremarkable echo    2. Mixed hyperlipidemia   ~ last checked ; no longer taking pravastatin / fish oil   ~CT calcium score : zero      I had the opportunity to review the clinical symptoms and presentation of Simba Robins. Plan:     1. Continue lisinopril taking routinely qpm : agreeable to taking 10 mg qpm  2. F/U as scheduled in July with Dr. Kelle Benson    Overall the patient is stable from CV standpoint    I have addresed the patient's cardiac risk factors and adjusted pharmacologic treatment as needed. In addition, I have reinforced the need for patient directed risk factor modification. Further evaluation will be based upon the patient's clinical course and testing results. All questions and concerns were addressed to the patient. Alternatives to my treatment were discussed. The patient is not currently smoking. The risks related to smoking were reviewed with the patient. Recommend maintaining a smoke-free lifestyle. Patient is prescribed a beta-blocker  Patient is on an ace-i  Patient is not on a statin : no longer taking pravastatin ; had been using natural remedies / diet    Dual Antiplatelet therapy / anti-coagulation has not been recommended / prescribed for this patient. The patient verbalizes understanding not to stop medications without discussing with us. Discussed exercise: 30-60 minutes 7 days/week  Discussed Low saturated fat/RAS diet. Thank you for allowing to us to participate in the care of Simba Robins.     DICKSON Khan Documentation of today's visit sent to PCP

## 2021-05-07 NOTE — PATIENT INSTRUCTIONS
Take lisinopril at 10 mg every evening routinely    Keep appt with Dr. Consuelo Maradiaga as scheduled in July

## 2021-07-14 ENCOUNTER — OFFICE VISIT (OUTPATIENT)
Dept: CARDIOLOGY CLINIC | Age: 51
End: 2021-07-14
Payer: COMMERCIAL

## 2021-07-14 VITALS
OXYGEN SATURATION: 99 % | HEIGHT: 70 IN | BODY MASS INDEX: 29.2 KG/M2 | WEIGHT: 204 LBS | HEART RATE: 80 BPM | SYSTOLIC BLOOD PRESSURE: 118 MMHG | DIASTOLIC BLOOD PRESSURE: 88 MMHG

## 2021-07-14 DIAGNOSIS — R07.9 CHEST PAIN, UNSPECIFIED TYPE: ICD-10-CM

## 2021-07-14 DIAGNOSIS — E78.2 MIXED HYPERLIPIDEMIA: ICD-10-CM

## 2021-07-14 DIAGNOSIS — I10 ESSENTIAL HYPERTENSION: Primary | ICD-10-CM

## 2021-07-14 PROCEDURE — 99214 OFFICE O/P EST MOD 30 MIN: CPT | Performed by: INTERNAL MEDICINE

## 2021-07-14 NOTE — PROGRESS NOTES
[address]   Cardiac Follow Up     Referring Provider:  Cesar Mcmahan MD     Chief Complaint   Patient presents with    Hypertension    Hyperlipidemia        History of Present Illness:  Bakari Medina is a 46 y.o. male with a history of non-cardiac chest pain and HTN. He is no longer taking lisinopril- he felt that he didn't need it. He is active, works out regularly without symptoms. He is working to control his anxiety without medications, he states he does not like to take pills. He is going through a divorce under stress at home. He is working out, eats a Mediterranean diet. He is monitoring BP at home- reports his BP is well controlled. Past Medical History:   has a past medical history of Anxiety and Hypertension. Surgical History:   has a past surgical history that includes Vasectomy; Carpal tunnel release; Tonsillectomy; Colonoscopy (07/2018); and Upper gastrointestinal endoscopy (07/2018). Social History:   reports that he has quit smoking. He has quit using smokeless tobacco. He reports previous alcohol use. He reports that he does not use drugs. Family History:  family history includes Heart Disease (age of onset: 61) in his mother; Heart Disease (age of onset: 72) in his father. Home Medications:  Prior to Admission medications    Medication Sig Start Date End Date Taking?  Authorizing Provider   Ascorbic Acid (VITAMIN C) 500 MG/5ML LIQD Take by mouth   Yes Historical Provider, MD   ZINC PO Take by mouth daily   Yes Historical Provider, MD   vitamin B-12 (CYANOCOBALAMIN) 100 MCG tablet Take 50 mcg by mouth daily   Yes Historical Provider, MD   Cholecalciferol (VITAMIN D3) 50 MCG (2000 UT) CAPS Take by mouth   Yes Historical Provider, MD   lisinopril (PRINIVIL;ZESTRIL) 10 MG tablet Take 1 tablet by mouth every evening  Patient not taking: Reported on 7/14/2021 5/7/21   DICKSON Edward - CNP        Allergies:  Caffeine and Betamethasone     Review of Systems:   · Constitutional: there has been no unanticipated weight loss. There's been no change in energy level, sleep pattern, or activity level. · Eyes: No visual changes or diplopia. No scleral icterus. · ENT: No Headaches, hearing loss or vertigo. No mouth sores or sore throat. · Cardiovascular: Reviewed in HPI  · Respiratory: No cough or wheezing, no sputum production. No hematemesis. · Gastrointestinal: No abdominal pain, appetite loss, blood in stools. No change in bowel or bladder habits. · Genitourinary: No dysuria, trouble voiding, or hematuria. · Musculoskeletal:  No gait disturbance, weakness or joint complaints. · Integumentary: No rash or pruritis. · Neurological: No headache, diplopia, change in muscle strength, numbness or tingling. No change in gait, balance, coordination, mood, affect, memory, mentation, behavior. · Psychiatric: No anxiety, no depression. · Endocrine: No malaise, fatigue or temperature intolerance. No excessive thirst, fluid intake, or urination. No tremor. · Hematologic/Lymphatic: No abnormal bruising or bleeding, blood clots or swollen lymph nodes. · Allergic/Immunologic: No nasal congestion or hives. Physical Examination:    Vitals:    07/14/21 1004   BP: 118/88   Pulse: 80   SpO2: 99%        Wt Readings from Last 1 Encounters:   07/14/21 204 lb (92.5 kg)       Constitutional and General Appearance:NAD  Skin:good turgor,intact without lesions  HEENT: EOMI ,normal  Neck:no JVD     Respiratory:  · Normal excursion and expansion without use of accessory muscles  · Resp Auscultation: Normal breath sounds without dullness  Cardiovascular:  · The apical impulses not displaced  · Heart tones are crisp and normal  · Cervical veins are not engorged  · The carotid upstroke is normal in amplitude and contour without delay or bruit  · Peripheral pulses are symmetrical and full  · There is no clubbing, cyanosis of the extremities.   · No edema  · Femoral Arteries: 2+ and equal  · Pedal Pulses: 2+ and equal   Abdomen:  · No masses or tenderness  · Liver/Spleen: No Abnormalities Noted  Neurological/Psychiatric:  · Alert and oriented in all spheres  · Moves all extremities well  · Exhibits normal gait balance and coordination  · No abnormalities of mood, affect, memory, mentation, or behavior are noted      Assessment/Plan:    1. Hypertension - Blood pressure 118/88, pulse 80, height 5' 10\" (1.778 m), weight 204 lb (92.5 kg), SpO2 99 %. He is no longer taking Lisinopril        2. Hyperlipidemia -CT calcium score previously ordered- not yet completed- if elevated will need statins    3. Chest pain-atypical discomfort -exercises routinely without discomfort- resolved     1/4/21>  Normal stress echocardiogram study. Plan:  Fabiana Cortez has a stable cardiac status. Cardiac test and lab results personally reviewed by me during this office visit and discussed. Clint Annmarie [phone] - to discuss Anxiety   Continue risk factor modifications. Call for any change in symptoms, call to report any changes in shortness of breath or development of chest pain with activity. Return for regular follow up in 6 months. I appreciate the opportunity of cooperating in the care of this individual.    Gustavo Rivera M.D., [facility] [place]    Patient's problem list, medications, allergies, past medical, surgical, social and family histories were reviewed and updated as appropriate. Scribe's attestation: This note was scribed in the presence of Dr. Abdelrahman Rivera MD, by Brenda Castelan RN. The scribe's documentation has been prepared under my direction and personally reviewed by me in its entirety. I confirm that the note above accurately reflects all work, treatment, procedures, and medical decision making performed by me.

## 2021-12-22 ENCOUNTER — TELEPHONE (OUTPATIENT)
Dept: CARDIOLOGY CLINIC | Age: 51
End: 2021-12-22

## 2021-12-22 NOTE — PROGRESS NOTES
[facility], [place], operated by [facility]   Cardiac Follow Up     Referring Provider:  No primary care provider on file. Chief Complaint   Patient presents with    Follow-up    Hypertension    Hyperlipidemia        History of Present Illness:  Carolina Couch is a 46 y.o. male with a history of non-cardiac chest pain and HTN. Hospitalized at [facility] 9/6-9/30  - COVID-19 multifocal PNAwith AResF/hypoxia: completed remdesivir, decadron, baricitinib, Zn; cont vit C; vit D insufficiency- slowly weaning O2 down, still desatting w/ minimal activity but recovers  - Severe deconditioning, limited pulm reserve- would benefit from IPR  - SIRS, fever, leukocytosis: secondary infection; H.parainfluenza PNA, completed 7d cefepime  Acute kidney insufficiency: Likely due to dehydration, poss d/t COVID nephropathy? Resolved;  - LLE SVT, RLL isolated subsegmental PE; cont eliquis, patient to remain on therapy for 3 months. Thrombocytopenia: Likely due to COVID-19 infection. recovering  Hypertension: Stable on beta-blockers  - Bigeminy, trigeminy: seen by cardio; likely d/t COVID/AReSF;  - SVT, poss exacerbated by scheduled nebs-improved    He was hospitalized in Sept for Covid, for approx 1 month, he then spent a few weeks in [facility] in Oct, was discharged on home 02. He reports his HR has been elevated at home, he was started on Metoprolol in the hospital.    He is currently under stress at home, he is going through a divorce. He continues to be short of breath, he has not yet returned to work. He continues to be deconditioned, though improving, recent CXR shows improvement. Past Medical History:   has a past medical history of Anxiety and Hypertension. Surgical History:   has a past surgical history that includes Vasectomy; Carpal tunnel release; Tonsillectomy; Colonoscopy (07/2018); and Upper gastrointestinal endoscopy (07/2018). Social History:   reports that he has quit smoking.  He has quit using smokeless fluid intake, or urination. No tremor. · Hematologic/Lymphatic: No abnormal bruising or bleeding, blood clots or swollen lymph nodes. · Allergic/Immunologic: No nasal congestion or hives. Physical Examination:    Vitals:    12/27/21 1400   BP: (!) 158/92   Pulse: 97   SpO2: 99%        Wt Readings from Last 1 Encounters:   12/27/21 205 lb (93 kg)       Constitutional and General Appearance:NAD  Skin:good turgor,intact without lesions  HEENT: EOMI ,normal  Neck:no JVD     Respiratory:  · Normal excursion and expansion without use of accessory muscles  · Resp Auscultation: Normal breath sounds without dullness  Cardiovascular:  · The apical impulses not displaced  · Heart tones are crisp and normal  · Cervical veins are not engorged  · The carotid upstroke is normal in amplitude and contour without delay or bruit  · Peripheral pulses are symmetrical and full  · There is no clubbing, cyanosis of the extremities. · No edema  · Femoral Arteries: 2+ and equal  · Pedal Pulses: 2+ and equal   Abdomen:  · No masses or tenderness  · Liver/Spleen: No Abnormalities Noted  Neurological/Psychiatric:  · Alert and oriented in all spheres  · Moves all extremities well  · Exhibits normal gait balance and coordination  · No abnormalities of mood, affect, memory, mentation, or behavior are noted      Assessment/Plan:    1. Hypertension - Blood pressure (!) 158/92, pulse 97, height 5' 10\" (1.778 m), weight 205 lb (93 kg), SpO2 99 %. He is no longer taking Lisinopril, on metoprolol - would not make med changes at this time continues to recover from covid - he will monitor BP at home   ~unremarkable echo     2. Hyperlipidemia   ~ last checked ; no longer taking pravastatin / fish oil   ~CT calcium score : zero     3. Chest pain-atypical discomfort - resolved     1/4/21>  Normal stress echocardiogram study.     4.      HX of Covid 19- hospitalized in sept, inpatient rehab in Oct- continues to be short of breath - seen by Cards in hospital, echo     Plan:  Cardiac test and lab results personally reviewed by me during this office visit and discussed. No further testing needed at this time  Stop Eliquis Jan 1 2022- this is 3 months post PE   Would recommend Covid vaccine in the Spring- 6 months post infection   Monitor BP at home   Continue risk factor modifications. Call for any change in symptoms, call to report any changes in shortness of breath or development of chest pain with activity. Return for regular follow up in 3 months. I appreciate the opportunity of cooperating in the care of this individual.    Betty Bolanos. Ji Cevallos M.D., [facility]    Patient's problem list, medications, allergies, past medical, surgical, social and family histories were reviewed and updated as appropriate. Scribe's attestation: This note was scribed in the presence of Dr. Ji Cevallos MD, by Jacqui Barrios RN. The scribe's documentation has been prepared under my direction and personally reviewed by me in its entirety. I confirm that the note above accurately reflects all work, treatment, procedures, and medical decision making performed by me.

## 2021-12-22 NOTE — TELEPHONE ENCOUNTER
Pt calling in. He thinks he is in bad shape. He thinks the COVID has done a lot to him. He was hospitalized for six weeks, including rehab with it, came home  10/21  Having high heart rates at time, and has home oxygen if he needs it. Running in the high 90's, to the high 80's at times. He was told it's a deconditioning issue, but he was very active before this. Going through a divorce, as well. Asking to see Dr. Vini Garcia.

## 2021-12-22 NOTE — TELEPHONE ENCOUNTER
Please call pt to schedule OV with LES at [facility], can add 12/27 at 215p, ok to use hold

## 2021-12-27 ENCOUNTER — OFFICE VISIT (OUTPATIENT)
Dept: CARDIOLOGY CLINIC | Age: 51
End: 2021-12-27
Payer: COMMERCIAL

## 2021-12-27 VITALS
HEART RATE: 97 BPM | BODY MASS INDEX: 29.35 KG/M2 | HEIGHT: 70 IN | SYSTOLIC BLOOD PRESSURE: 158 MMHG | DIASTOLIC BLOOD PRESSURE: 92 MMHG | OXYGEN SATURATION: 99 % | WEIGHT: 205 LBS

## 2021-12-27 DIAGNOSIS — E78.2 MIXED HYPERLIPIDEMIA: ICD-10-CM

## 2021-12-27 DIAGNOSIS — R06.02 SOB (SHORTNESS OF BREATH): ICD-10-CM

## 2021-12-27 DIAGNOSIS — I10 ESSENTIAL HYPERTENSION: Primary | ICD-10-CM

## 2021-12-27 PROCEDURE — 93000 ELECTROCARDIOGRAM COMPLETE: CPT | Performed by: INTERNAL MEDICINE

## 2021-12-27 PROCEDURE — 99214 OFFICE O/P EST MOD 30 MIN: CPT | Performed by: INTERNAL MEDICINE

## 2022-03-11 ENCOUNTER — TELEPHONE (OUTPATIENT)
Dept: CARDIOLOGY CLINIC | Age: 52
End: 2022-03-11

## 2022-03-11 NOTE — TELEPHONE ENCOUNTER
Pt states he began having chest pain over the last 4-5 day. Any exertion his pulse raises 106-130 for about a min then goes back down. Pt states he is going to [place] on Friday 3/18/22 and is asking to be seen before he leaves.  Please call to advise what date and time he can be added

## 2022-03-11 NOTE — TELEPHONE ENCOUNTER
Dr Justin Bernal does not have anything next week. He would like to have Mr Catalina Nation to be seen by an NP. I sent a message to [facility] to see if we can fit in on Tuesday. Waiting for OK and time from her.

## 2022-03-15 ENCOUNTER — OFFICE VISIT (OUTPATIENT)
Dept: CARDIOLOGY CLINIC | Age: 52
End: 2022-03-15
Payer: COMMERCIAL

## 2022-03-15 ENCOUNTER — HOSPITAL ENCOUNTER (OUTPATIENT)
Age: 52
Discharge: HOME | End: 2022-03-15
Payer: COMMERCIAL

## 2022-03-15 VITALS
HEART RATE: 95 BPM | SYSTOLIC BLOOD PRESSURE: 132 MMHG | DIASTOLIC BLOOD PRESSURE: 84 MMHG | OXYGEN SATURATION: 99 % | HEIGHT: 70 IN | WEIGHT: 212.3 LBS | BODY MASS INDEX: 30.39 KG/M2

## 2022-03-15 DIAGNOSIS — I10 ESSENTIAL HYPERTENSION: ICD-10-CM

## 2022-03-15 DIAGNOSIS — R06.02 SHORTNESS OF BREATH: ICD-10-CM

## 2022-03-15 DIAGNOSIS — R06.02 SHORTNESS OF BREATH: Primary | ICD-10-CM

## 2022-03-15 DIAGNOSIS — R00.0 TACHYCARDIA: ICD-10-CM

## 2022-03-15 DIAGNOSIS — R07.89 OTHER CHEST PAIN: ICD-10-CM

## 2022-03-15 DIAGNOSIS — E78.2 MIXED HYPERLIPIDEMIA: ICD-10-CM

## 2022-03-15 LAB
ANION GAP SERPL CALCULATED.3IONS-SCNC: 12 MMOL/L (ref 3–16)
BUN BLDV-MCNC: 10 MG/DL (ref 7–20)
CALCIUM SERPL-MCNC: 9 MG/DL (ref 8.3–10.6)
CHLORIDE BLD-SCNC: 105 MMOL/L (ref 99–110)
CO2: 25 MMOL/L (ref 21–32)
CREAT SERPL-MCNC: 1 MG/DL (ref 0.9–1.3)
GFR AFRICAN AMERICAN: >60
GFR NON-AFRICAN AMERICAN: >60
GLUCOSE BLD-MCNC: 88 MG/DL (ref 70–99)
HCT VFR BLD CALC: 49.1 % (ref 40.5–52.5)
HEMOGLOBIN: 16.6 G/DL (ref 13.5–17.5)
MCH RBC QN AUTO: 29.9 PG (ref 26–34)
MCHC RBC AUTO-ENTMCNC: 33.9 G/DL (ref 31–36)
MCV RBC AUTO: 88.4 FL (ref 80–100)
PDW BLD-RTO: 15.1 % (ref 12.4–15.4)
PLATELET # BLD: 188 K/UL (ref 135–450)
PMV BLD AUTO: 9.3 FL (ref 5–10.5)
POTASSIUM SERPL-SCNC: 4.6 MMOL/L (ref 3.5–5.1)
RBC # BLD: 5.56 M/UL (ref 4.2–5.9)
SODIUM BLD-SCNC: 142 MMOL/L (ref 136–145)
TSH REFLEX: 1.02 UIU/ML (ref 0.27–4.2)
WBC # BLD: 7.3 K/UL (ref 4–11)

## 2022-03-15 PROCEDURE — 99214 OFFICE O/P EST MOD 30 MIN: CPT | Performed by: NURSE PRACTITIONER

## 2022-03-15 PROCEDURE — 85027 COMPLETE CBC AUTOMATED: CPT

## 2022-03-15 PROCEDURE — 93000 ELECTROCARDIOGRAM COMPLETE: CPT | Performed by: NURSE PRACTITIONER

## 2022-03-15 PROCEDURE — 36415 COLL VENOUS BLD VENIPUNCTURE: CPT

## 2022-03-15 PROCEDURE — 84443 ASSAY THYROID STIM HORMONE: CPT

## 2022-03-15 PROCEDURE — 80048 BASIC METABOLIC PNL TOTAL CA: CPT

## 2022-03-15 RX ORDER — TAMSULOSIN HYDROCHLORIDE 0.4 MG/1
CAPSULE ORAL
COMMUNITY
Start: 2022-02-22 | End: 2022-08-22

## 2022-03-15 RX ORDER — CLONAZEPAM 0.5 MG/1
TABLET ORAL
COMMUNITY
Start: 2022-02-17

## 2022-03-15 NOTE — PROGRESS NOTES
[facility]     Outpatient Follow Up Note    CHIEF COMPLAINT / HPI:  Follow Up secondary to shortness of breath    Subjective:   Macy Banuelos is 45 y.o. male who presents today with a history of non-cardiac chest pain and HTN. Hospitalization with [facility] 9/2021. Today, Mr Marycarmen Noonan is here for an acute visit with complaint of persistent, exertional shortness of breath since COVID. Heart rate gets as high as 130s with movement but quickly recovers. He says his PCP in [place] just did a CXR which \"showed significant improvement. \" Says PCP also ordered PFTs but he wants them done here. Mr Marycarmen Noonan says there has been slight improvement in heart rate and shortness of breath since October but it is slow progression and he is very frustrated. He has been off of oxygen for 1.5 months. He says that when he takes a deep breath in it feels like he is sucking on menthol (unchanged since October). With regard to medication therapy the patient has been compliant with prescribed regimen. They have tolerated therapy to date. Past Medical History:   Diagnosis Date    Anxiety     Hypertension      Social History:    Social History     Tobacco Use   Smoking Status Former Smoker   Smokeless Tobacco Former User     Current Medications:  Current Outpatient Medications   Medication Sig Dispense Refill    metoprolol tartrate (LOPRESSOR) 25 MG tablet Take 25 mg by mouth 2 times daily      apixaban (ELIQUIS) 5 MG TABS tablet Take 5 mg by mouth      Ascorbic Acid (VITAMIN C) 500 MG/5ML LIQD Take by mouth      ZINC PO Take by mouth daily      vitamin B-12 (CYANOCOBALAMIN) 100 MCG tablet Take 50 mcg by mouth daily      Cholecalciferol (VITAMIN D3) 50 MCG (2000 UT) CAPS Take by mouth       No current facility-administered medications for this visit. REVIEW OF SYSTEMS:    CONSTITUTIONAL: No major weight gain or loss, fatigue, weakness, night sweats or fever.   HEENT: No new vision difficulties or ringing in the ears. RESPIRATORY: No new SOB, PND, orthopnea or cough. CARDIOVASCULAR: See HPI  GI: No nausea, vomiting, diarrhea, constipation, abdominal pain or changes in bowel habits. : No urinary frequency, urgency, incontinence hematuria or dysuria. SKIN: No cyanosis or skin lesions. MUSCULOSKELETAL: No new muscle or joint pain. NEUROLOGICAL: No syncope or TIA-like symptoms. PSYCHIATRIC: No anxiety, pain, insomnia or depression    Objective:   PHYSICAL EXAM:      Wt Readings from Last 3 Encounters:   12/27/21 205 lb (93 kg)   07/14/21 204 lb (92.5 kg)   05/07/21 201 lb 6.4 oz (91.4 kg)          VITALS:  /84 (Site: Left Upper Arm, Position: Sitting, Cuff Size: Medium Adult)   Pulse 95   Ht 5' 10\" (1.778 m)   Wt 212 lb 4.8 oz (96.3 kg)   SpO2 99%   BMI 30.46 kg/m²   CONSTITUTIONAL: Cooperative, no apparent distress, and appears well nourished / developed  NEUROLOGIC:  Awake and orientated to person, place and time. PSYCH: Calm affect. SKIN: Warm and dry. HEENT: Sclera non-icteric, normocephalic, neck supple, no elevation of JVP, normal carotid pulses with no bruits and thyroid normal size. LUNGS:  No increased work of breathing and clear to auscultation, no crackles or wheezing  CARDIOVASCULAR:  Regular rate and rhythm with no murmurs, gallops, rubs, or abnormal heart sounds, normal PMI. The apical impulses not displaced  Heart tones are crisp and normal  Cervical veins are not engorged  The carotid upstroke is normal in amplitude and contour without delay or bruit  JVP is not elevated  ABDOMEN:  Normal bowel sounds, non-distended and non-tender to palpation  EXT: No edema, no calf tenderness. Pulses are present bilaterally.     DATA:    Lab Results   Component Value Date    ALT 23 07/30/2020    AST 21 07/30/2020    ALKPHOS 58 07/30/2020    BILITOT 0.5 07/30/2020     Lab Results   Component Value Date    CREATININE 1.0 07/30/2020    BUN 18 07/30/2020     07/30/2020 K 4.7 07/30/2020     07/30/2020    CO2 26 07/30/2020     No results found for: TSH, I5OXTXF, I4VHGFW, THYROIDAB  Lab Results   Component Value Date    WBC 7.8 07/30/2020    HGB 16.1 07/30/2020    HCT 47.7 07/30/2020    MCV 90.7 07/30/2020     07/30/2020     No components found for: CHLPL  Lab Results   Component Value Date    TRIG 124 07/30/2020    TRIG 197 (H) 02/28/2020    TRIG 117 12/08/2015     Lab Results   Component Value Date    HDL 42 07/30/2020    HDL 40 02/28/2020    HDL 42 12/08/2015     Lab Results   Component Value Date    LDLCALC 161 (H) 07/30/2020    LDLCALC 146 (H) 02/28/2020    LDLCALC 156 (H) 12/08/2015     Lab Results   Component Value Date    LABVLDL 25 07/30/2020    LABVLDL 39 02/28/2020    LABVLDL 23 12/08/2015     Lab Results   Component Value Date    BNP <15 01/25/2010     Radiology Review:  Pertinent images / reports were reviewed as a part of this visit and reveals the following:    Last Stress Echo Test: 1/4/2021   Summary   Normal stress echocardiogram study. Excellent exercise tolerance. Summary   -Normal left ventricle size, wall thickness and systolic function with an   estimated ejection fraction of 55%.   -No regional wall motion abnormalities are seen.   -Normal diastolic function. E/e\"=8.15.   -Mild mitral regurgitation.   -Trivial tricuspid regurgitation.   -Estimated pulmonary artery systolic pressure is normal at 23 mmHg assuming   a right atrial pressure of 3 mmHg. CT Calcium score 2/1/21  CONCLUSION:   1. No calcified atherosclerotic disease of the coronary arteries. 2. A 0.7 cm in diameter noncalcified nodule in the left lung lower lobe posterior basal    segment.  Recommend baseline chest CT examination. Last ECG: 3/15/22  Sinus  Rhythm   WITHIN NORMAL LIMITS     Assessment:      Diagnosis Orders   1.  Shortness of breath   ~ persistent exertional SOB since COVID (slight improvement overall)  ~ recent CXR showed significant improvement (per pt, xray done in [facility] [place])  ~ lungs clear Full PFT Study With Bronchodilator  Echo 2D w doppler w color complete  BMP, CBC,   2. Other chest pain   ~ improving  ~ EKG today SR  ~ Calcium score 2/1/21 zero   ~ Stress Echo 1/4/21 normal stress echo study. EF 69% normal diastolic function   ~ Stress Echo 5/7/2018 normal stress echo study    3. Tachycardia   ~ HR elevated with movement (up to 130s per patient), recovers quickly  ~ not interested in cardiac monitor   ~ probable deconditioning component     4. Essential hypertension   ~ controlled; 132/84 in office today     5. Mixed hyperlipidemia   ~  7/30/20  ~ no longer on pravastatin/fish oil  ~ calcium score zero     6. History of PE           ~ Eliquis stopped 1/1/22    I had the opportunity to review the clinical symptoms and presentation of Fredy Rosales. Plan:     1. Recheck echo post COVID and schedule PFTs   2. Patient not interested in wearing cardiac monitor at this time. He does not think symptoms are similar to when he had PE and is not interested in CT  3. Appointment scheduled with Dr Jose Mi 4/18    Overall the patient is stable from CV standpoint    I have addresed the patient's cardiac risk factors and adjusted pharmacologic treatment as needed. In addition, I have reinforced the need for patient directed risk factor modification. Further evaluation will be based upon the patient's clinical course and testing results. All questions and concerns were addressed to the patient/family. Alternatives to my treatment were discussed. The patient is not currently smoking. The risks related to smoking were reviewed with the patient. Recommend maintaining a smoke-free lifestyle. Patient is on a beta-blocker  Patient is not on an ace-i/ARB; no sHF  Patient is not on a statin; no HLD    Dual Antiplatelet therapy / anti-coagulation has not been recommended / prescribed for this patient. .    The patient verbalizes understanding not to stop medications without discussing with us. Discussed exercise: 30-60 minutes 7 days/week  Discussed Low saturated fat/RAS diet. Thank you for allowing to us to participate in the care of [address] [place] [address].     Electronically signed by DICKSON Knapp CNP on 3/14/2022 at 8:22 PM     Documentation of today's visit sent to PCP

## 2022-03-16 PROBLEM — R00.0 TACHYCARDIA: Status: ACTIVE | Noted: 2022-03-16

## 2022-04-05 ENCOUNTER — HOSPITAL ENCOUNTER (OUTPATIENT)
Dept: NON INVASIVE DIAGNOSTICS | Age: 52
Discharge: HOME | End: 2022-04-05
Payer: COMMERCIAL

## 2022-04-05 ENCOUNTER — TELEPHONE (OUTPATIENT)
Dept: CARDIOLOGY CLINIC | Age: 52
End: 2022-04-05

## 2022-04-05 ENCOUNTER — HOSPITAL ENCOUNTER (OUTPATIENT)
Dept: PULMONOLOGY | Age: 52
Discharge: HOME | End: 2022-04-05
Payer: COMMERCIAL

## 2022-04-05 VITALS — RESPIRATION RATE: 16 BRPM | HEART RATE: 79 BPM | OXYGEN SATURATION: 97 %

## 2022-04-05 DIAGNOSIS — R06.02 SHORTNESS OF BREATH: ICD-10-CM

## 2022-04-05 LAB
DLCO %PRED: 96 %
DLCO PRED: NORMAL
DLCO/VA %PRED: NORMAL
DLCO/VA PRED: NORMAL
DLCO/VA: NORMAL
DLCO: NORMAL
EXPIRATORY TIME: NORMAL
FEF 25-75% %PRED-PRE: NORMAL
FEF 25-75% PRED: NORMAL
FEF 25-75%-PRE: NORMAL
FEV1 %PRED-PRE: 71 %
FEV1 PRED: NORMAL
FEV1/FVC %PRED-PRE: NORMAL
FEV1/FVC PRED: NORMAL
FEV1/FVC: 112 %
FEV1: NORMAL
FVC %PRED-PRE: NORMAL
FVC PRED: NORMAL
FVC: NORMAL
GAW %PRED: NORMAL
GAW PRED: NORMAL
GAW: NORMAL
IC %PRED: NORMAL
IC PRED: NORMAL
IC: NORMAL
LV EF: 55 %
LVEF MODALITY: NORMAL
MVV %PRED-PRE: NORMAL
MVV PRED: NORMAL
MVV-PRE: NORMAL
PEF %PRED-PRE: NORMAL
PEF PRED: NORMAL
PEF-PRE: NORMAL
RAW %PRED: NORMAL
RAW PRED: NORMAL
RAW: NORMAL
RV %PRED: NORMAL
RV PRED: NORMAL
RV: NORMAL
SVC %PRED: NORMAL
SVC PRED: NORMAL
SVC: NORMAL
TLC %PRED: 73 %
TLC PRED: NORMAL
TLC: NORMAL
VA %PRED: NORMAL
VA PRED: NORMAL
VA: NORMAL
VTG %PRED: NORMAL
VTG PRED: NORMAL
VTG: NORMAL

## 2022-04-05 PROCEDURE — 94726 PLETHYSMOGRAPHY LUNG VOLUMES: CPT

## 2022-04-05 PROCEDURE — 94010 BREATHING CAPACITY TEST: CPT

## 2022-04-05 PROCEDURE — 94200 LUNG FUNCTION TEST (MBC/MVV): CPT

## 2022-04-05 PROCEDURE — 94760 N-INVAS EAR/PLS OXIMETRY 1: CPT

## 2022-04-05 PROCEDURE — 93306 TTE W/DOPPLER COMPLETE: CPT

## 2022-04-05 PROCEDURE — 94729 DIFFUSING CAPACITY: CPT

## 2022-04-05 RX ORDER — ALBUTEROL SULFATE 90 UG/1
4 AEROSOL, METERED RESPIRATORY (INHALATION) ONCE
Status: CANCELLED | OUTPATIENT
Start: 2022-04-05

## 2022-04-05 ASSESSMENT — PULMONARY FUNCTION TESTS
FEV1_PERCENT_PREDICTED_PRE: 71
FEV1/FVC: 112

## 2022-04-06 NOTE — PROCEDURES
Pulmonary Function Testing      Patient name:  Bakari Medina     [facility] Unit #:   [id number]   Date of test:  4/6/2022  Date of interpretation:   4/6/2022    Mr. Bakari Medina is a 45y.o. year-old non smoker. The spirometry data were acceptable and reproducible. Spirometry:  Flow volume loops were normal. The FEV-1/FVC ratio was normal. The  Prebronchodilator FEV-1 was 2.71 liters (71% of predicted), which was moderately decreased. The FVC was 3.14 liters (63% of predicted), which was decreased. Response to inhaled bronchodilators (albuterol) was not performed. Lung volumes:  Lung volumes were tested by plethysmography. The total lung capacity was 4.96 liters (73% of predicted), which was decreased. The residual volume was 1.78 liters (78% of predicted), which was decreased. The ratio of residual volume to total lung capacity (RV/TLC) was 102, which was normal. Specific airway resistance was normal.    Diffusion capacity was found to be normal.       Interpretation:  Restrictive lung disease with normal diffusion capacity.

## 2022-04-07 NOTE — TELEPHONE ENCOUNTER
Pt is asking for a recommendation from [facility] for pulmonologist please advise      [phone]

## 2022-04-08 DIAGNOSIS — R94.2 ABNORMAL PFT: Primary | ICD-10-CM

## 2022-04-08 NOTE — PROGRESS NOTES
[address]   Cardiac Follow Up     Referring Provider:  Jenny Almanzar MD     No chief complaint on file. History of Present Illness:  Félix Rainey is a 45 y.o. male with a history of non-cardiac chest pain and HTN. He was hospitalized for approximately 1 month in Sept 2021 for Covid parainfluenza PNA and at that time he had acute kidney insufficiency and RLL PE (Eliquis stopped 1/1/22). He then spent a few weeks in [facility] in Oct, was discharged on home 02. Patient seen by [facility] on 3-15-22 for shortness of breath since Covid and HR 130s with movement - echo and PFTs ordered (see below). Today, he is here for 3 month office visit. Past Medical History:   has a past medical history of Anxiety, COVID, and Hypertension. Surgical History:   has a past surgical history that includes Vasectomy; Carpal tunnel release; Tonsillectomy; Colonoscopy (07/2018); and Upper gastrointestinal endoscopy (07/2018). Social History:   reports that he has quit smoking. He has quit using smokeless tobacco. He reports previous alcohol use. He reports that he does not use drugs. Family History:  family history includes Heart Disease (age of onset: 61) in his mother; Heart Disease (age of onset: 72) in his father. Home Medications:  Prior to Admission medications    Medication Sig Start Date End Date Taking?  Authorizing Provider   tamsulosin (FLOMAX) 0.4 MG capsule TAKE 1 CAPSULE BY MOUTH ONCE DAILY 2/22/22   Historical Provider, MD   clonazePAM (KLONOPIN) 0.5 MG tablet TAKE 1 TABLET BY MOUTH TWICE DAILY 2/17/22   Historical Provider, MD   metoprolol tartrate (LOPRESSOR) 25 MG tablet Take 25 mg by mouth 2 times daily 9/29/21   Historical Provider, MD   Ascorbic Acid (VITAMIN C) 500 MG/5ML LIQD Take by mouth  Patient not taking: Reported on 3/15/2022    Historical Provider, MD   ZINC PO Take by mouth daily  Patient not taking: Reported on 3/15/2022    Historical Provider, MD   vitamin B-12 (CYANOCOBALAMIN) 100 MCG tablet Take 50 mcg by mouth daily    Historical Provider, MD        Allergies:  Caffeine and Betamethasone     Review of Systems:   Constitutional: there has been no unanticipated weight loss. There's been no change in energy level, sleep pattern, or activity level. Skin:good turgor,intact without lesions  HEENT: EOMI ,normal  · Neck:no JVD  · Eyes: No visual changes or diplopia. No scleral icterus. · ENT: No Headaches, hearing loss or vertigo. No mouth sores or sore throat. · Cardiovascular: Reviewed in HPI  · Respiratory: No cough or wheezing, no sputum production. No hematemesis. · Gastrointestinal: No abdominal pain, appetite loss, blood in stools. No change in bowel or bladder habits. · Genitourinary: No dysuria, trouble voiding, or hematuria. · Musculoskeletal:  No gait disturbance, weakness or joint complaints. · Integumentary: No rash or pruritis. · Neurological: No headache, diplopia, change in muscle strength, numbness or tingling. No change in gait, balance, coordination, mood, affect, memory, mentation, behavior. · Psychiatric: No anxiety, no depression. · Endocrine: No malaise, fatigue or temperature intolerance. No excessive thirst, fluid intake, or urination. No tremor. · Hematologic/Lymphatic: No abnormal bruising or bleeding, blood clots or swollen lymph nodes. · Allergic/Immunologic: No nasal congestion or hives. Physical Examination:    There were no vitals filed for this visit.      Wt Readings from Last 1 Encounters:   03/15/22 212 lb 4.8 oz (96.3 kg)       Constitutional and General Appearance:NAD  Skin:good turgor,intact without lesions  HEENT: EOMI ,normal  Neck:no JVD     Respiratory:  · Normal excursion and expansion without use of accessory muscles  · Resp Auscultation: Normal breath sounds without dullness  Cardiovascular:  · The apical impulses not displaced  · Heart tones are crisp and normal  · Cervical veins are not engorged  · The carotid upstroke is normal in amplitude and contour without delay or bruit  · Peripheral pulses are symmetrical and full  · There is no clubbing, cyanosis of the extremities. · No edema  · Femoral Arteries: 2+ and equal  · Pedal Pulses: 2+ and equal   Abdomen:  · No masses or tenderness  · Liver/Spleen: No Abnormalities Noted  Neurological/Psychiatric:  · Alert and oriented in all spheres  · Moves all extremities well  · Exhibits normal gait balance and coordination  · No abnormalities of mood, affect, memory, mentation, or behavior are noted      Assessment/Plan:    1. Hypertension - There were no vitals taken for this visit. He is no longer taking Lisinopril, on metoprolol 25mg   ~unremarkable echo      2. Hyperlipidemia   ~ last checked (7/30/2020) ; no longer taking pravastatin / fish oil   ~CT calcium score : zero     3. Chest pain-atypical discomfort - resolved   Calcium score 2/1/21 zero    1/4/21>  Normal stress echocardiogram study. 4. SOB - HX of Covid 19- hospitalized in sept, inpatient rehab in Oct - seen by Cards in hospital, echo   Summary of echo 4/5/22   Normal left ventricle size, wall thickness and systolic function with an   estimated ejection fraction of 55%. No definitive regional wall motion abnormalities are noted. Normal function of all valves. Normal diastolic function. Unable to estimate pulmonary artery pressure secondary to incomplete TR jet   envelope. PFTs 4/5/22  Interpretation:  Restrictive lung disease with normal diffusion capacity. Plan:  Cardiac test and lab results personally reviewed by me during this office visit and discussed. Continue risk factor modifications. Call for any change in symptoms, call to report any changes in shortness of breath or development of chest pain with activity.   Return for regular follow up in *** months.      ***scribe    I appreciate the opportunity of cooperating in the care of this individual.    Mil Zuniga Joseph Topete M.D., [facility]

## 2022-04-08 NOTE — TELEPHONE ENCOUNTER
Called and spoke to patient. He says continues to have shortness of breath and oxygen even drops to 89 when he's walking on the stairs. He is frustrated over the slow progress and he says he is not deconditioned. Discussed PFT results and patient would like to be referred to pulmonologist here as he is not in [place] any longer.  Will place referral.

## 2022-04-18 ENCOUNTER — OFFICE VISIT (OUTPATIENT)
Dept: CARDIOLOGY CLINIC | Age: 52
End: 2022-04-18
Payer: COMMERCIAL

## 2022-04-18 VITALS
HEIGHT: 70 IN | SYSTOLIC BLOOD PRESSURE: 124 MMHG | BODY MASS INDEX: 31.17 KG/M2 | HEART RATE: 85 BPM | WEIGHT: 217.7 LBS | OXYGEN SATURATION: 99 % | DIASTOLIC BLOOD PRESSURE: 86 MMHG

## 2022-04-18 DIAGNOSIS — I10 ESSENTIAL HYPERTENSION: Primary | ICD-10-CM

## 2022-04-18 DIAGNOSIS — R06.02 SHORTNESS OF BREATH: ICD-10-CM

## 2022-04-18 DIAGNOSIS — R07.9 CHEST PAIN, UNSPECIFIED TYPE: ICD-10-CM

## 2022-04-18 DIAGNOSIS — E78.49 OTHER HYPERLIPIDEMIA: ICD-10-CM

## 2022-04-18 DIAGNOSIS — I10 PRIMARY HYPERTENSION: ICD-10-CM

## 2022-04-18 PROCEDURE — 99214 OFFICE O/P EST MOD 30 MIN: CPT | Performed by: INTERNAL MEDICINE

## 2022-04-18 NOTE — PROGRESS NOTES
[address]   Cardiac Follow Up     Referring Provider:  Janet Jimenez MD     Chief Complaint   Patient presents with    Hypertension    Hyperlipidemia    Shortness of Breath    Palpitations    3 Month Follow-Up        History of Present Illness:  Ankita Hermosillo is a 45 y.o. male with a history of non-cardiac chest pain and HTN. He was hospitalized for approximately 1 month in Sept 2021 for Covid parainfluenza PNA and at that time he had acute kidney insufficiency and RLL PE (Eliquis stopped 1/1/22). He then spent a few weeks in [facility] in Oct, was discharged on home 02. Patient seen by [facility] on 3-15-22 for shortness of breath since Covid and HR 130s with movement - echo and PFTs ordered (see below). Today, he is here for 3 month office visit. Patient reports having multiple lingering symptoms since his COVID illness in Sept '21 including loss of hair, brain fog, visual changes, weight gain, ongoing GI symptoms, and shortness of breath with activity. He has also noticed tachycardia with activity that can persist for hours after an activity is finished. He denies exertional chest pain. His heart rate will be persistently elevated with activity more than it has in the past including HR of 120 bpm after walking up a flight of steps. He will be seeing a pulmonologist for ongoing shortness of breath and lung issues post COVID infection. Past Medical History:   has a past medical history of Anxiety, COVID, and Hypertension. Surgical History:   has a past surgical history that includes Vasectomy; Carpal tunnel release; Tonsillectomy; Colonoscopy (07/2018); and Upper gastrointestinal endoscopy (07/2018). Social History:   reports that he has quit smoking. He has quit using smokeless tobacco. He reports previous alcohol use. He reports that he does not use drugs.      Family History:  family history includes Heart Disease (age of onset: 61) in his mother; Heart Disease (age of onset: 72) in his father. Home Medications:  Prior to Admission medications    Medication Sig Start Date End Date Taking? Authorizing Provider   tamsulosin (FLOMAX) 0.4 MG capsule TAKE 1 CAPSULE BY MOUTH ONCE DAILY 2/22/22  Yes Historical Provider, MD   clonazePAM (KLONOPIN) 0.5 MG tablet TAKE 1 TABLET BY MOUTH TWICE DAILY 2/17/22  Yes Historical Provider, MD   metoprolol tartrate (LOPRESSOR) 25 MG tablet Take 25 mg by mouth 2 times daily 9/29/21  Yes Historical Provider, MD        Allergies:  Caffeine and Betamethasone     Review of Systems:   Constitutional: there has been no unanticipated weight loss. There's been no change in energy level, sleep pattern, or activity level. Skin:good turgor,intact without lesions  HEENT: EOMI ,normal  · Neck:no JVD  · Eyes: No visual changes or diplopia. No scleral icterus. · ENT: No Headaches, hearing loss or vertigo. No mouth sores or sore throat. · Cardiovascular: Reviewed in HPI  · Respiratory: No cough or wheezing, no sputum production. No hematemesis. HARRIS  · Gastrointestinal: No abdominal pain, appetite loss, blood in stools. No change in bowel or bladder habits. · Genitourinary: No dysuria, trouble voiding, or hematuria. · Musculoskeletal:  No gait disturbance, weakness or joint complaints. · Integumentary: No rash or pruritis. · Neurological: No headache, diplopia, change in muscle strength, numbness or tingling. No change in gait, balance, coordination, mood, affect, memory, mentation, behavior. · Psychiatric: No anxiety, no depression. · Endocrine: No malaise, fatigue or temperature intolerance. No excessive thirst, fluid intake, or urination. No tremor. · Hematologic/Lymphatic: No abnormal bruising or bleeding, blood clots or swollen lymph nodes. · Allergic/Immunologic: No nasal congestion or hives.     Physical Examination:    Vitals:    04/18/22 0936   BP: 124/86   Pulse: 85   SpO2: 99%        Wt Readings from Last 1 Encounters:   04/18/22 217 lb 11.2 oz (98.7 kg)       Constitutional and General Appearance:NAD  Skin:good turgor,intact without lesions  HEENT: EOMI ,normal  Neck:no JVD     Respiratory:  · Normal excursion and expansion without use of accessory muscles  · Resp Auscultation: Normal breath sounds without dullness  Cardiovascular:  · The apical impulses not displaced  · Heart tones are crisp and normal  · Cervical veins are not engorged  · The carotid upstroke is normal in amplitude and contour without delay or bruit  · Peripheral pulses are symmetrical and full  · There is no clubbing, cyanosis of the extremities. · No edema  · Femoral Arteries: 2+ and equal  · Pedal Pulses: 2+ and equal   Abdomen:  · No masses or tenderness  · Liver/Spleen: No Abnormalities Noted  Neurological/Psychiatric:  · Alert and oriented in all spheres  · Moves all extremities well  · Exhibits normal gait balance and coordination  · No abnormalities of mood, affect, memory, mentation, or behavior are noted      Assessment/Plan:    1. Hypertension - Blood pressure 124/86, pulse 85, height 5' 10\" (1.778 m), weight 217 lb 11.2 oz (98.7 kg), SpO2 99 %. 4/5/22 Echo - EF 55%, normal VV size, wall thickness, and EF of 55%  He is no longer taking Lisinopril ; currently on metoprolol tartrate 25mg bid  Will wean off Metoprolol tartrate due to ongoing lung issues post COVID     2. Hyperlipidemia   ~ last checked (7/30/2020) ; no longer taking pravastatin / fish oil   ~CT calcium score : zero     3. Chest pain-atypical discomfort - resolved   Calcium score 2/1/21 zero    1/4/21>  Normal stress echocardiogram study. 4. SOB - HX of Covid 19- hospitalized in sept, inpatient rehab in Oct   Summary of echo 4/5/22   Normal left ventricle size, wall thickness and systolic function with an   estimated ejection fraction of 55%. No definitive regional wall motion abnormalities are noted. Normal function of all valves. Normal diastolic function.    Unable to

## 2022-04-18 NOTE — PATIENT INSTRUCTIONS
1. Wean off Metoprolol tartrate. Decrease Metoprolol to 12.5 mg twice a day for one week ; then decrease Metoprolol tartrate to 12.5 mg daily for one week, then stop. Call our office if you feel your heart rate is increasing more off Metoprolol   2. Call our office with any concerning cardiac symptoms including chest discomfort, worsening shortness of breath, etc  3.   Follow up with Dr. Roger Long in 3-4 months

## 2022-05-11 ENCOUNTER — TELEPHONE (OUTPATIENT)
Dept: CARDIOLOGY CLINIC | Age: 52
End: 2022-05-11

## 2022-05-11 NOTE — TELEPHONE ENCOUNTER
Left message on machine for patient to call back about the message below, Need to ask more question chest pain, sob, ect. Will need to try again later.

## 2022-05-11 NOTE — TELEPHONE ENCOUNTER
Pt states he has bad palpitations when he wakes up and while he's lies down for bed. Palpitations started getting worse after weaning off of metoprolol, Pt doesn't notice it while moving around.

## 2022-05-11 NOTE — TELEPHONE ENCOUNTER
Pt called stating he is having palpations, 4- beep skips beat, 8-beeps skips beat. Pt thinks it started back when he was wearing off of the metoprolol, but is not sure. On call dr told him to go back on the metoprolol, pt stated he does not feel it at work. Pt wants to know what he can to.     Pls advise thank you

## 2022-05-11 NOTE — TELEPHONE ENCOUNTER
Spoke to pt and he v/u, he also stated he started back taking Metoprolol over the weekend per on-call dr's advice. He will continue to take metoprolol and give the office a call back if sx persists.

## 2022-05-16 ENCOUNTER — TELEPHONE (OUTPATIENT)
Dept: CARDIOLOGY CLINIC | Age: 52
End: 2022-05-16

## 2022-05-16 NOTE — TELEPHONE ENCOUNTER
Patient state that ever since LES had to to start weaning off of his metoprolol succinate (TOPROL XL) 25 MG extended release tablet    He started having palpitations within the first 24-36 hour after. This has been a constant thing since nonstop. Patient wants to know if he needs to be seen. Patient [phone]. If he doesn't  answer he said to leave a message he will call back.

## 2022-05-16 NOTE — TELEPHONE ENCOUNTER
DIONNE GALLEGOS. He saw Adrianomanasa Spencer 3/15/22. She has openings tomorrow. Please see if he can come in then. Thank you.

## 2022-05-17 ENCOUNTER — OFFICE VISIT (OUTPATIENT)
Dept: CARDIOLOGY CLINIC | Age: 52
End: 2022-05-17
Payer: COMMERCIAL

## 2022-05-17 VITALS
HEIGHT: 70 IN | WEIGHT: 219.2 LBS | HEART RATE: 95 BPM | SYSTOLIC BLOOD PRESSURE: 134 MMHG | DIASTOLIC BLOOD PRESSURE: 88 MMHG | BODY MASS INDEX: 31.38 KG/M2 | OXYGEN SATURATION: 99 %

## 2022-05-17 DIAGNOSIS — R00.0 TACHYCARDIA: ICD-10-CM

## 2022-05-17 DIAGNOSIS — R07.89 OTHER CHEST PAIN: ICD-10-CM

## 2022-05-17 DIAGNOSIS — R06.02 SHORTNESS OF BREATH: Primary | ICD-10-CM

## 2022-05-17 DIAGNOSIS — I10 ESSENTIAL HYPERTENSION: ICD-10-CM

## 2022-05-17 DIAGNOSIS — E78.2 MIXED HYPERLIPIDEMIA: ICD-10-CM

## 2022-05-17 DIAGNOSIS — R00.2 PALPITATIONS: ICD-10-CM

## 2022-05-17 PROCEDURE — 99214 OFFICE O/P EST MOD 30 MIN: CPT | Performed by: NURSE PRACTITIONER

## 2022-05-17 PROCEDURE — 93225 XTRNL ECG REC<48 HRS REC: CPT | Performed by: INTERNAL MEDICINE

## 2022-05-17 NOTE — PROGRESS NOTES
[address]     Outpatient Follow Up Note    CHIEF COMPLAINT / HPI:  Follow Up secondary to shortness of breath    Subjective:   Chastity Barba is 45 y.o. male who presents today with a history of non-cardiac chest pain and HTN. LOV metoprolol was stopped given decrease in lung reserve. Today, Mr Calderon Garrido says he called the on-call MD a couple weeks ago with increased palpitations since stopping the metoprolol. He was told to resume his beta blocker at the original dose. He has taken the medication every day for the past two weeks and has noticed no change in symptoms. He thinks his fast heart rates overall are improving, though. Mr Calderon Garrido saw a pulmonologist 5/11. He says he continues to dip and has not been eating healthy lately. With regard to medication therapy the patient has been compliant with prescribed regimen. They have tolerated therapy to date. Past Medical History:   Diagnosis Date    Anxiety     COVID     Hypertension      Social History:    Social History     Tobacco Use   Smoking Status Former Smoker   Smokeless Tobacco Former User     Current Medications:  Current Outpatient Medications   Medication Sig Dispense Refill    tamsulosin (FLOMAX) 0.4 MG capsule TAKE 1 CAPSULE BY MOUTH ONCE DAILY      clonazePAM (KLONOPIN) 0.5 MG tablet TAKE 1 TABLET BY MOUTH TWICE DAILY       No current facility-administered medications for this visit. REVIEW OF SYSTEMS:    CONSTITUTIONAL: No major weight gain or loss, fatigue, weakness, night sweats or fever. HEENT: No new vision difficulties or ringing in the ears. RESPIRATORY: No new PND, orthopnea or cough. SOB   CARDIOVASCULAR: See HPI  GI: No nausea, vomiting, diarrhea, constipation, abdominal pain or changes in bowel habits. : No urinary frequency, urgency, incontinence hematuria or dysuria. SKIN: No cyanosis or skin lesions. MUSCULOSKELETAL: No new muscle or joint pain.   NEUROLOGICAL: No syncope or TIA-like symptoms. PSYCHIATRIC: No anxiety, pain, insomnia or depression    Objective:   PHYSICAL EXAM:      Wt Readings from Last 3 Encounters:   04/18/22 217 lb 11.2 oz (98.7 kg)   03/15/22 212 lb 4.8 oz (96.3 kg)   12/27/21 205 lb (93 kg)          VITALS:  /88 (Site: Right Upper Arm, Position: Sitting, Cuff Size: Medium Adult)   Pulse 95   Ht 5' 10\" (1.778 m)   Wt 219 lb 3.2 oz (99.4 kg)   SpO2 99%   BMI 31.45 kg/m²   CONSTITUTIONAL: Cooperative, no apparent distress, and appears well nourished / developed  NEUROLOGIC:  Awake and orientated to person, place and time. PSYCH: Calm affect. SKIN: Warm and dry. HEENT: Sclera non-icteric, normocephalic, neck supple, no elevation of JVP, normal carotid pulses with no bruits and thyroid normal size. LUNGS:  No increased work of breathing and clear to auscultation, no crackles or wheezing  CARDIOVASCULAR:  Regular rate and rhythm with no murmurs, gallops, rubs, or abnormal heart sounds, normal PMI. The apical impulses not displaced  Heart tones are crisp and normal  Cervical veins are not engorged  The carotid upstroke is normal in amplitude and contour without delay or bruit  JVP is not elevated  ABDOMEN:  Normal bowel sounds, non-distended and non-tender to palpation  EXT: No edema, no calf tenderness. Pulses are present bilaterally.     DATA:    Lab Results   Component Value Date    ALT 23 07/30/2020    AST 21 07/30/2020    ALKPHOS 58 07/30/2020    BILITOT 0.5 07/30/2020     Lab Results   Component Value Date    CREATININE 1.0 03/15/2022    BUN 10 03/15/2022     03/15/2022    K 4.6 03/15/2022     03/15/2022    CO2 25 03/15/2022     No results found for: TSH, T3CIABU, O3ADWJT, THYROIDAB  Lab Results   Component Value Date    WBC 7.3 03/15/2022    HGB 16.6 03/15/2022    HCT 49.1 03/15/2022    MCV 88.4 03/15/2022     03/15/2022     No components found for: CHLPL  Lab Results   Component Value Date    TRIG 124 07/30/2020    TRIG 197 (H) 02/28/2020 TRIG 117 12/08/2015     Lab Results   Component Value Date    HDL 42 07/30/2020    HDL 40 02/28/2020    HDL 42 12/08/2015     Lab Results   Component Value Date    LDLCALC 161 (H) 07/30/2020    LDLCALC 146 (H) 02/28/2020    LDLCALC 156 (H) 12/08/2015     Lab Results   Component Value Date    LABVLDL 25 07/30/2020    LABVLDL 39 02/28/2020    LABVLDL 23 12/08/2015     Lab Results   Component Value Date    BNP <15 01/25/2010     Radiology Review:  Pertinent images / reports were reviewed as a part of this visit and reveals the following:    Last Echo 4/5/22  Summary   Normal left ventricle size, wall thickness and systolic function with an   estimated ejection fraction of 55%. No definitive regional wall motion abnormalities are noted. Normal function of all valves. Normal diastolic function. Unable to estimate pulmonary artery pressure secondary to incomplete TR jet   envelope. Last Stress Echo Test: 1/4/2021   Summary   Normal stress echocardiogram study. Excellent exercise tolerance. Summary   -Normal left ventricle size, wall thickness and systolic function with an   estimated ejection fraction of 55%.   -No regional wall motion abnormalities are seen.   -Normal diastolic function. E/e\"=8.15.   -Mild mitral regurgitation.   -Trivial tricuspid regurgitation.   -Estimated pulmonary artery systolic pressure is normal at 23 mmHg assuming   a right atrial pressure of 3 mmHg. Pulmonary Function Test 4/5/22  Interpretation:  Restrictive lung disease with normal diffusion capacity. CT Calcium score 2/1/21  CONCLUSION:   1. No calcified atherosclerotic disease of the coronary arteries. 2. A 0.7 cm in diameter noncalcified nodule in the left lung lower lobe posterior basal    segment.  Recommend baseline chest CT examination. Last ECG: 3/15/22  Sinus  Rhythm   WITHIN NORMAL LIMITS     Assessment:      Diagnosis Orders   1.  Shortness of breath   ~ persistent exertional SOB since COVID  ~ no change in symptoms off of BB   ~ followed with pulmonologist who said slow improvement typical pattern for patients with prolonged covid symptoms and things should improve over time. No medications added/changed. ~ lungs clear    2. Other chest pain   ~ resolved   ~ Calcium score 2/1/21 zero   ~ Stress Echo 1/4/21 normal stress echo study. EF 94% normal diastolic function   ~ Stress Echo 5/7/2018 normal stress echo study    3. Tachycardia   ~ HR elevated with movement (up to 130s per patient), recovers quickly  ~ probable deconditioning component     4. Essential hypertension   ~ controlled; 134/88 in office today     5. Mixed hyperlipidemia   ~  7/30/20  ~ no longer on pravastatin/fish oil  ~ calcium score zero     6. History of PE           ~ Eliquis stopped 1/1/22  7. Palpitations            ~ metoprolol stopped 4/18/22 due to SOB and known decreased lung reserve            ~ Increased palpations since then > on-call MD put him back on BB but no change in symptoms    I had the opportunity to review the clinical symptoms and presentation of Fabiana Cortez. Plan:     1. 48 hour monitor ordered for worsening palpitations unrelieved with resuming BB. Discussed possibly changing BB or increasing dose but he is very hesitant as he does not want to be on any medications as is. 2. Appointment scheduled with Dr Abdelrahman Rivera in August but I will call when monitor results are in. Overall the patient is stable from CV standpoint    I have addresed the patient's cardiac risk factors and adjusted pharmacologic treatment as needed. In addition, I have reinforced the need for patient directed risk factor modification. Further evaluation will be based upon the patient's clinical course and testing results. All questions and concerns were addressed to the patient/family. Alternatives to my treatment were discussed. The patient is not currently smoking.  The risks related to smoking were reviewed with the patient. Recommend maintaining a smoke-free lifestyle. Patient is on a beta-blocker  Patient is not on an ace-i/ARB; no sHF  Patient is not on a statin; no HLD    Dual Antiplatelet therapy / anti-coagulation has not been recommended / prescribed for this patient. .    The patient verbalizes understanding not to stop medications without discussing with us. Discussed exercise: 30-60 minutes 7 days/week  Discussed Low saturated fat/RAS diet. Thank you for allowing to us to participate in the care of [address] [place] [address].     Electronically signed by DICKSON Wilkins CNP on 5/16/2022 at 8:35 PM     Documentation of today's visit sent to PCP

## 2022-05-26 PROCEDURE — 93227 XTRNL ECG REC<48 HR R&I: CPT | Performed by: INTERNAL MEDICINE

## 2022-05-27 ENCOUNTER — TELEPHONE (OUTPATIENT)
Dept: CARDIOLOGY CLINIC | Age: 52
End: 2022-05-27

## 2022-05-27 NOTE — TELEPHONE ENCOUNTER
Pt states he is still having palpitations regularly and is asking if 48 hr monitor results are available. Please call pt to advise.

## 2022-06-01 ENCOUNTER — TELEPHONE (OUTPATIENT)
Dept: CARDIOLOGY CLINIC | Age: 52
End: 2022-06-01

## 2022-06-01 NOTE — TELEPHONE ENCOUNTER
Discussed monitor results with patient. He said that he may not have even been taking the beta blocker when he was at appointment 5/17/22. He only to the metoprolol \"here and there\" while wearing his monitor. Monitor showed PAT symptoms while in SVT. Instructed him to resume metoprolol 25 BID and take consistently. Patient verbalized understanding. He will call back in 2-3 weeks to let me know if there is improvement in symptoms.

## 2022-06-01 NOTE — TELEPHONE ENCOUNTER
Pt is calling about his monitor results and also he is still currently having all the palpitations  And says that these are different ones that he keeps having, very concerned and wants to know what to do. Please advise.

## 2022-06-28 ENCOUNTER — TELEPHONE (OUTPATIENT)
Dept: CARDIOLOGY CLINIC | Age: 52
End: 2022-06-28

## 2022-06-28 NOTE — LETTER
[facility] - [address]  [address] [address]  Phone: [phone]  Fax: [phone]    Jossy Trevizo MD        June 28, 2022     Patient: Kiesha Camacho   YOB: 1970   Date of Visit: 6/28/2022       To Whom It May Concern: It is my medical opinion that Kiesha Camacho  can be cleared at low cardiac risk. He is stable for this procedure. There are no apparent cardiac contraindications to the proposed procedure using standard anesthetic technique. There are no apparent interventions to ameliorate the cardiac risk. This clinical assessment assumes a full Anesthesia evaluation for overall risk of airway management, type and route of anesthetic, and other relevant anesthesia-specific considerations. If you have any questions or concerns, please don't hesitate to call.     Sincerely,        Jossy Trevizo MD

## 2022-06-28 NOTE — TELEPHONE ENCOUNTER
CARDIAC CLEARANCE     What type of procedure are you having? Right knee arthroscopy     Which physician is performing your procedure? Dr. Muniz Sic    When is your procedure scheduled for? 0707/2022    Where are you having this procedure? [facility]    Are you taking Blood Thinners? no   If so what? (Name/dose/frequesncy)     Does the surgeon want you to stop your blood thinner? If so for how long? Phone Number and Contact Name for Physicians office:  Brandin Villalobos - [phone]    Fax number to send information: [phone]    NOTE:  Per Cami Or she is needing a copy of the Pt EKG tracing.   Please advise

## 2022-07-05 ENCOUNTER — TELEPHONE (OUTPATIENT)
Dept: CARDIOLOGY CLINIC | Age: 52
End: 2022-07-05

## 2022-07-05 NOTE — TELEPHONE ENCOUNTER
Pt called stating he would like LES to call him, everyone morning when he wakes he get he's fine as soon as he moves around he gets palpations 2 beats 12 beats 8 beats then 4 beats. Pt is SOB however he does not know if that is an after effect of him having covid back in  September,  Pt stated his is dizzy, and fatigue. Pt stated  from what he has read he feels like it is from the metoprolol, pt wants to know if a BP would help? Pt stated he has taken lisinopril in the passed and did nit have any problems, could there be something else going on getting missed?       Pls advise thank you

## 2022-07-05 NOTE — TELEPHONE ENCOUNTER
I spoke with pt. He stated that since he had Covid he has issues with palpitations. He states that he has non stop palpitations. He has concerns that he feels skipped beats. He says that he has that palps with or without the Metoprolol and feels that it is not benefiting him. He said that he has SOB and that he was told that his lung capacity was 73%. But no treatment would help. He feels that the Metoprolol is no longer necessary. He wants LES to CB and discuss.

## 2022-07-06 ENCOUNTER — TELEPHONE (OUTPATIENT)
Dept: CARDIOLOGY CLINIC | Age: 52
End: 2022-07-06

## 2022-07-06 NOTE — TELEPHONE ENCOUNTER
William Escobar called to request the Pt tracing of his EKG from March (most recent). Pt is having knee surgery 07/07. Please fax to [phone]. Please advise.

## 2022-07-28 ENCOUNTER — TELEPHONE (OUTPATIENT)
Dept: CARDIOLOGY CLINIC | Age: 52
End: 2022-07-28

## 2022-07-28 NOTE — TELEPHONE ENCOUNTER
Pt called stating he needs the test (echo, EKG tracings and Pulmonary Functions) and all the medical office notes, for his  because he is going through a divorce.     Pls advise thank you

## 2022-08-13 NOTE — PROGRESS NOTES
swollen above the knee. He has been taking prednisone. He saw pulmonologist once, he is not to see him again unless he worsens. He is going through a divorce. He is trying to work again. Past Medical History:   has a past medical history of Anxiety, COVID, and Hypertension. Surgical History:   has a past surgical history that includes Vasectomy; Carpal tunnel release; Tonsillectomy; Colonoscopy (07/2018); and Upper gastrointestinal endoscopy (07/2018). Social History:   reports that he has quit smoking. He has quit using smokeless tobacco. He reports that he does not currently use alcohol. He reports that he does not use drugs. Family History:  family history includes Heart Disease (age of onset: 61) in his mother; Heart Disease (age of onset: 72) in his father. Home Medications:  Prior to Admission medications    Medication Sig Start Date End Date Taking? Authorizing Provider   metoprolol tartrate (LOPRESSOR) 25 MG tablet Take 50 mg by mouth 2 times daily 25 am 25 pm   Yes Historical Provider, MD   clonazePAM (KLONOPIN) 0.5 MG tablet TAKE 1 TABLET BY MOUTH TWICE DAILY 2/17/22  Yes Historical Provider, MD   methylPREDNISolone (MEDROL DOSEPACK) 4 MG tablet FOLLOW PACKAGE DIRECTIONS 8/17/22   Historical Provider, MD        Allergies:  Caffeine and Betamethasone     Review of Systems:   Constitutional: there has been no unanticipated weight loss. There's been no change in energy level, sleep pattern, or activity level. Skin:good turgor,intact without lesions  HEENT: EOMI ,normal  Neck:no JVD  Eyes: No visual changes or diplopia. No scleral icterus. ENT: No Headaches, hearing loss or vertigo. No mouth sores or sore throat. Cardiovascular: Reviewed in HPI  Respiratory: No cough or wheezing, no sputum production. No hematemesis. HARRIS  Gastrointestinal: No abdominal pain, appetite loss, blood in stools. No change in bowel or bladder habits.   Genitourinary: No dysuria, trouble voiding, or hematuria. Musculoskeletal:  No gait disturbance, weakness or joint complaints. Integumentary: No rash or pruritis. Neurological: No headache, diplopia, change in muscle strength, numbness or tingling. No change in gait, balance, coordination, mood, affect, memory, mentation, behavior. Psychiatric: No anxiety, no depression. Endocrine: No malaise, fatigue or temperature intolerance. No excessive thirst, fluid intake, or urination. No tremor. Hematologic/Lymphatic: No abnormal bruising or bleeding, blood clots or swollen lymph nodes. Allergic/Immunologic: No nasal congestion or hives. Physical Examination:    Vitals:    08/22/22 0950   BP: (!) 144/92   Pulse:    SpO2:           Wt Readings from Last 1 Encounters:   08/22/22 232 lb (105.2 kg)       Constitutional and General Appearance:NAD  Skin:good turgor,intact without lesions  HEENT: EOMI ,normal  Neck:no JVD     Respiratory:  Normal excursion and expansion without use of accessory muscles  Resp Auscultation: Normal breath sounds without dullness  Cardiovascular: The apical impulses not displaced  Heart tones are crisp and normal  Cervical veins are not engorged  The carotid upstroke is normal in amplitude and contour without delay or bruit  Peripheral pulses are symmetrical and full  There is no clubbing, cyanosis of the extremities. No edema  Femoral Arteries: 2+ and equal  Pedal Pulses: 2+ and equal   Abdomen:  No masses or tenderness  Liver/Spleen: No Abnormalities Noted  Neurological/Psychiatric:  Alert and oriented in all spheres  Moves all extremities well  Exhibits normal gait balance and coordination  No abnormalities of mood, affect, memory, mentation, or behavior are noted      Assessment/Plan:    1. Hypertension -   Blood pressure (!) 144/92, pulse 81, height 5' 10\" (1.778 m), weight 232 lb (105.2 kg), SpO2 99 %.   4/5/22 Echo - EF 55%, normal VV size, wall thickness, and EF of 55%  He is no longer taking Lisinopril   Advised to wean off Metoprolol tartrate last OV 4/18/22 due to ongoing lung issues post COVID, but then had increased palpitations, so advised to resume but did continue with palpations. Cardiac monitor 5/17/22-5/20/22 showed 2 short PAT, symptoms while in SVT. 2. Hyperlipidemia   ~ 7/30/2020   HDL 42 ; no longer taking pravastatin / fish oil   ~CT calcium score : zero     3. Chest pain  Calcium score 2/1/21 zero      1/4/21>  Normal stress echocardiogram study. Stress echo ordered 8/22/22     4. SOB -        HX of Covid 19- hospitalized in sept, inpatient rehab in Oct      Summary of echo 4/5/22   Normal left ventricle size, wall thickness and systolic function with an   estimated ejection fraction of 55%. No definitive regional wall motion abnormalities are noted. Normal function of all valves. Normal diastolic function. Unable to estimate pulmonary artery pressure secondary to incomplete TR jet   envelope. PFTs 4/5/22      Interpretation:  Restrictive lung disease with normal diffusion capacity. Has ongoing shortness of breath post COVID infection. Plan:    Cardiac test and lab results personally reviewed by me during this office visit and discussed. Stress echo (no resting pictures needed)  Repeat CMP , to evaluate liver enzymes  Consider 2nd opinion for knee pain  Continue risk factor modifications. Call for any change in symptoms, call to report any changes in shortness of breath or development of chest pain with activity. Follow up based on stress echo results          I appreciate the opportunity of cooperating in the care of this individual.    Akil Pathak M.D., [facility] [place]    Patient's problem list, medications, allergies, past medical, surgical, social and family histories were reviewed and updated as appropriate. Scribe's attestation: This note was scribed in the presence of Dr Ricky Pathak by Chiquis Will RN.   The scribe's documentation has been prepared under my direction and personally reviewed by me in its entirety. I confirm that the note above accurately reflects all work, treatment, procedures, and medical decision making performed by me.

## 2022-08-22 ENCOUNTER — HOSPITAL ENCOUNTER (OUTPATIENT)
Age: 52
Discharge: HOME | End: 2022-08-22
Payer: COMMERCIAL

## 2022-08-22 ENCOUNTER — OFFICE VISIT (OUTPATIENT)
Dept: CARDIOLOGY CLINIC | Age: 52
End: 2022-08-22
Payer: COMMERCIAL

## 2022-08-22 VITALS
BODY MASS INDEX: 33.21 KG/M2 | HEIGHT: 70 IN | OXYGEN SATURATION: 99 % | HEART RATE: 81 BPM | WEIGHT: 232 LBS | SYSTOLIC BLOOD PRESSURE: 144 MMHG | DIASTOLIC BLOOD PRESSURE: 92 MMHG

## 2022-08-22 DIAGNOSIS — I10 PRIMARY HYPERTENSION: ICD-10-CM

## 2022-08-22 DIAGNOSIS — E78.2 MIXED HYPERLIPIDEMIA: ICD-10-CM

## 2022-08-22 DIAGNOSIS — R06.02 SHORTNESS OF BREATH: Primary | ICD-10-CM

## 2022-08-22 DIAGNOSIS — R00.0 TACHYCARDIA: ICD-10-CM

## 2022-08-22 DIAGNOSIS — R06.02 SHORTNESS OF BREATH: ICD-10-CM

## 2022-08-22 LAB
A/G RATIO: 1.6 (ref 1.1–2.2)
ALBUMIN SERPL-MCNC: 4.2 G/DL (ref 3.4–5)
ALP BLD-CCNC: 72 U/L (ref 40–129)
ALT SERPL-CCNC: 21 U/L (ref 10–40)
ANION GAP SERPL CALCULATED.3IONS-SCNC: 17 MMOL/L (ref 3–16)
AST SERPL-CCNC: 18 U/L (ref 15–37)
BILIRUB SERPL-MCNC: 0.4 MG/DL (ref 0–1)
BUN BLDV-MCNC: 18 MG/DL (ref 7–20)
CALCIUM SERPL-MCNC: 9.2 MG/DL (ref 8.3–10.6)
CHLORIDE BLD-SCNC: 108 MMOL/L (ref 99–110)
CO2: 23 MMOL/L (ref 21–32)
CREAT SERPL-MCNC: 1.1 MG/DL (ref 0.9–1.3)
GFR AFRICAN AMERICAN: >60
GFR NON-AFRICAN AMERICAN: >60
GLUCOSE BLD-MCNC: 102 MG/DL (ref 70–99)
POTASSIUM SERPL-SCNC: 4.2 MMOL/L (ref 3.5–5.1)
SODIUM BLD-SCNC: 148 MMOL/L (ref 136–145)
TOTAL PROTEIN: 6.9 G/DL (ref 6.4–8.2)

## 2022-08-22 PROCEDURE — 36415 COLL VENOUS BLD VENIPUNCTURE: CPT

## 2022-08-22 PROCEDURE — 80053 COMPREHEN METABOLIC PANEL: CPT

## 2022-08-22 PROCEDURE — 99214 OFFICE O/P EST MOD 30 MIN: CPT | Performed by: INTERNAL MEDICINE

## 2022-08-22 RX ORDER — METHYLPREDNISOLONE 4 MG/1
TABLET ORAL
COMMUNITY
Start: 2022-08-17

## 2022-08-22 NOTE — PATIENT INSTRUCTIONS
Consider 2nd opinion for knee pain  Stress echo (no resting pictures needed)  Continue risk factor modifications. Call for any change in symptoms, call to report any changes in shortness of breath or development of chest pain with activity.     Follow up based on stress test results

## 2022-08-29 ENCOUNTER — TELEPHONE (OUTPATIENT)
Dept: CARDIOLOGY CLINIC | Age: 52
End: 2022-08-29

## 2022-08-29 NOTE — TELEPHONE ENCOUNTER
----- Message from Fabienne Nails MD sent at 8/29/2022 12:52 PM EDT -----  Labs are good including liver tests

## 2022-09-08 ENCOUNTER — HOSPITAL ENCOUNTER (OUTPATIENT)
Dept: NON INVASIVE DIAGNOSTICS | Age: 52
Discharge: HOME | End: 2022-09-08
Payer: COMMERCIAL

## 2022-09-08 DIAGNOSIS — R06.02 SHORTNESS OF BREATH: ICD-10-CM

## 2022-09-08 LAB
LV EF: 50 %
LVEF MODALITY: NORMAL

## 2022-09-08 PROCEDURE — 93351 STRESS TTE COMPLETE: CPT

## 2022-12-13 ENCOUNTER — TELEPHONE (OUTPATIENT)
Dept: CARDIOLOGY CLINIC | Age: 52
End: 2022-12-13

## 2022-12-13 NOTE — TELEPHONE ENCOUNTER
CARDIAC CLEARANCE     What type of procedure are you having? Partial right knee replacement  Which physician is performing your procedure? Dr Divya Burroughs ortho  When is your procedure scheduled for? Before end of year  Where are you having this procedure? [facility]   Are you taking Blood Thinners? NO   If so what? (Name/dose/frequesncy)     Does the surgeon want you to stop your blood thinner? If so for how long?     Phone Number and Contact Name for Physicians office: Jhonny Tidwell [phone]    Fax number to send information: [phone]

## 2022-12-23 NOTE — PROGRESS NOTES
ATTEMPTED TO CONTACT PATIENT VIA NUMBER GIVEN. NO ANSWER AND VOICEMAIL FULL. UNABLE TO LEAVE MESSAGE. ALSO TRIED PATIENT'S WIFE. SAME EXACT RESULT/DS    12.27.2022 0820  ATTEMPTED AGAIN TO REACH PATIENT. NO ANSWER AND MAILBOX FULL. CALLED PT'S SPOUSE. SHE ANSWERED AND STATED THEY ARE  AND SHE DOESN'T KNOW WHERE HE IS./DS    0830  CALLED NIESHA AT DR FRANKS'S OFFICE REGARDING NO CONTACT. ALSO QUESTIONED IF PT SHOULD BE ORDERED TO GET DECADRON DOS DUE TO LISTED ALLERGIES/DS    1115 SPOKE WITH NIESHA REGARDING IF STEROID SHOULD BE ORDERED DOS OR NOT.  INFORMED HE HAD NOT RECEIVED AN ANSWER AT THIS TIME/DS    1330  SPOKE TO NIESHA REGARDING PT'S ALLERGY TO PREDNISONE. DR FRANKS ORDERED THE DECADRON NOT TO BE ORDERED/DS

## 2022-12-23 NOTE — PROGRESS NOTES
Place patient label inside box (if no patient label, complete below)  Name:  :  MR#:     Laurie Mondragon / PROCEDURE  I (we) Ramakrishnakenyon Adame  (Patient Name) authorize DR Jorge Serna MD  (Provider / Sathya Boyd) and/or such assistants as may be selected by him/her, to perform the following operation/procedure(s): RIGHT MEDIAL UNI KNEE ARTHROPLASTY       Note: If unable to obtain consent prior to an emergent procedure, document the emergent reason in the medical record. This procedure has been explained to my (our) satisfaction and included in the explanation was: The intended benefit, nature, and extent of the procedure to be performed; The significant risks involved and the probability of success; Alternative procedures and methods of treatment; The dangers and probable consequences of such alternatives (including no procedure or treatment); The expected consequences of the procedure on my future health; Whether other qualified individuals would be performing important surgical tasks and/or whether  would be present to advise or support the procedure. I (we) understand that there are other risks of infection and other serious complications in the pre-operative/procedural and postoperative/procedural stages of my (our) care. I (we) have asked all of the questions which I (we) thought were important in deciding whether or not to undergo treatment or diagnosis. These questions have been answered to my (our) satisfaction. I (we) understand that no assurance can be given that the procedure will be a success, and no guarantee or warranty of success has been given to me (us). It has been explained to me (us) that during the course of the operation/procedure, unforeseen conditions may be revealed that necessitate extension of the original procedure(s) or different procedure(s) than those set forth in Paragraph 1.  I (we) authorize and request that the above-named physician, his/her assistants or his/her designees, perform procedures as necessary and desirable if deemed to be in my (our) best interest.     Revised 8/2/2021                                                                          Page 1 of 2         I acknowledge that health care personnel may be observing this procedure for the purpose of medical education or other specified purposes as may be necessary as requested and/or approved by my (our) physician. I (we) consent to the disposal by the hospital Pathologist of the removed tissue, parts or organs in accordance with hospital policy. I do ____ do not ____ consent to the use of a local infiltration pain blocking agent that will be used by my provider/surgical provider to help alleviate pain during my procedure. I do ____ do not ____ consent to an emergent blood transfusion in the case of a life-threatening situation that requires blood components to be administered. This consent is valid for 24 hours from the beginning of the procedure. This patient does ____ or does not ____ currently have a DNR status/order. If DNR order is in place, obtain Addendum to the Surgical Consent for ALL Patients with a DNR Order to address edi-operative status for limited intervention or DNR suspension.      I have read and fully understand the above Consent for Operation/Procedure and that all blanks were completed before I signed the consent.   _____________________________       _____________________      ____/____am/pm  Signature of Patient or legal representative      Printed Name / Relationship            Date / Time   ____________________________       _____________________      ____/____am/pm  Witness to Signature                                    Printed Name                    Date / Time    If patient is unable to sign or is a minor, complete the following)  Patient is a minor, ____ years of age, or unable to sign because: ______________________________________________________________________________________________    If a phone consent is obtained, consent will be documented by using two health care professionals, each affirming that the consenting party has no questions and gives consent for the procedure discussed with the physician/provider.   _____________________          ____________________       _____/_____am/pm   2nd witness to phone consent        Printed name           Date / Time    Informed Consent:  I have provided the explanation described above in section 1 to the patient and/or legal representative.  I have provided the patient and/or legal representative with an opportunity to ask any questions about the proposed operation/procedure.   ___________________________          ____________________         ____/____am/pm  Provider / Proceduralist                            Printed name            Date / Time  Revised 8/2/2021                                                                      Page 2 of 2

## 2022-12-27 ENCOUNTER — ANESTHESIA EVENT (OUTPATIENT)
Dept: OPERATING ROOM | Age: 52
End: 2022-12-27
Payer: COMMERCIAL

## 2022-12-28 ENCOUNTER — ANESTHESIA (OUTPATIENT)
Dept: OPERATING ROOM | Age: 52
End: 2022-12-28
Payer: COMMERCIAL

## 2022-12-28 ENCOUNTER — APPOINTMENT (OUTPATIENT)
Dept: GENERAL RADIOLOGY | Age: 52
End: 2022-12-28
Attending: ORTHOPAEDIC SURGERY
Payer: COMMERCIAL

## 2022-12-28 ENCOUNTER — HOSPITAL ENCOUNTER (OUTPATIENT)
Age: 52
Setting detail: OUTPATIENT SURGERY
Discharge: HOME | End: 2022-12-28
Attending: ORTHOPAEDIC SURGERY | Admitting: ORTHOPAEDIC SURGERY
Payer: COMMERCIAL

## 2022-12-28 VITALS
TEMPERATURE: 97.2 F | HEART RATE: 63 BPM | WEIGHT: 234.6 LBS | DIASTOLIC BLOOD PRESSURE: 91 MMHG | SYSTOLIC BLOOD PRESSURE: 133 MMHG | OXYGEN SATURATION: 94 % | BODY MASS INDEX: 33.58 KG/M2 | RESPIRATION RATE: 16 BRPM | HEIGHT: 70 IN

## 2022-12-28 DIAGNOSIS — M17.11 OSTEOARTHRITIS OF RIGHT KNEE, UNSPECIFIED OSTEOARTHRITIS TYPE: Primary | ICD-10-CM

## 2022-12-28 LAB
ABO/RH: NORMAL
ANTIBODY SCREEN: NORMAL
GLUCOSE BLD-MCNC: 117 MG/DL (ref 70–99)
PERFORMED ON: ABNORMAL

## 2022-12-28 PROCEDURE — 6360000002 HC RX W HCPCS: Performed by: ORTHOPAEDIC SURGERY

## 2022-12-28 PROCEDURE — 2500000003 HC RX 250 WO HCPCS: Performed by: ORTHOPAEDIC SURGERY

## 2022-12-28 PROCEDURE — 3700000000 HC ANESTHESIA ATTENDED CARE: Performed by: ORTHOPAEDIC SURGERY

## 2022-12-28 PROCEDURE — 2580000003 HC RX 258: Performed by: ORTHOPAEDIC SURGERY

## 2022-12-28 PROCEDURE — 7100000011 HC PHASE II RECOVERY - ADDTL 15 MIN: Performed by: ORTHOPAEDIC SURGERY

## 2022-12-28 PROCEDURE — 97535 SELF CARE MNGMENT TRAINING: CPT

## 2022-12-28 PROCEDURE — 86901 BLOOD TYPING SEROLOGIC RH(D): CPT

## 2022-12-28 PROCEDURE — 97530 THERAPEUTIC ACTIVITIES: CPT

## 2022-12-28 PROCEDURE — 97165 OT EVAL LOW COMPLEX 30 MIN: CPT

## 2022-12-28 PROCEDURE — 2720000010 HC SURG SUPPLY STERILE: Performed by: ORTHOPAEDIC SURGERY

## 2022-12-28 PROCEDURE — 73560 X-RAY EXAM OF KNEE 1 OR 2: CPT

## 2022-12-28 PROCEDURE — 7100000000 HC PACU RECOVERY - FIRST 15 MIN: Performed by: ORTHOPAEDIC SURGERY

## 2022-12-28 PROCEDURE — 64447 NJX AA&/STRD FEMORAL NRV IMG: CPT | Performed by: ANESTHESIOLOGY

## 2022-12-28 PROCEDURE — 86900 BLOOD TYPING SEROLOGIC ABO: CPT

## 2022-12-28 PROCEDURE — 2709999900 HC NON-CHARGEABLE SUPPLY: Performed by: ORTHOPAEDIC SURGERY

## 2022-12-28 PROCEDURE — 3600000015 HC SURGERY LEVEL 5 ADDTL 15MIN: Performed by: ORTHOPAEDIC SURGERY

## 2022-12-28 PROCEDURE — 86850 RBC ANTIBODY SCREEN: CPT

## 2022-12-28 PROCEDURE — 3700000001 HC ADD 15 MINUTES (ANESTHESIA): Performed by: ORTHOPAEDIC SURGERY

## 2022-12-28 PROCEDURE — C1776 JOINT DEVICE (IMPLANTABLE): HCPCS | Performed by: ORTHOPAEDIC SURGERY

## 2022-12-28 PROCEDURE — A4217 STERILE WATER/SALINE, 500 ML: HCPCS | Performed by: ORTHOPAEDIC SURGERY

## 2022-12-28 PROCEDURE — 7100000001 HC PACU RECOVERY - ADDTL 15 MIN: Performed by: ORTHOPAEDIC SURGERY

## 2022-12-28 PROCEDURE — 6370000000 HC RX 637 (ALT 250 FOR IP): Performed by: ORTHOPAEDIC SURGERY

## 2022-12-28 PROCEDURE — 7100000010 HC PHASE II RECOVERY - FIRST 15 MIN: Performed by: ORTHOPAEDIC SURGERY

## 2022-12-28 PROCEDURE — 3600000005 HC SURGERY LEVEL 5 BASE: Performed by: ORTHOPAEDIC SURGERY

## 2022-12-28 PROCEDURE — 6360000002 HC RX W HCPCS: Performed by: ANESTHESIOLOGY

## 2022-12-28 PROCEDURE — 62325 NJX INTERLAMINAR CRV/THRC: CPT | Performed by: ANESTHESIOLOGY

## 2022-12-28 PROCEDURE — 6360000002 HC RX W HCPCS: Performed by: NURSE ANESTHETIST, CERTIFIED REGISTERED

## 2022-12-28 PROCEDURE — 2580000003 HC RX 258: Performed by: ANESTHESIOLOGY

## 2022-12-28 PROCEDURE — C1713 ANCHOR/SCREW BN/BN,TIS/BN: HCPCS | Performed by: ORTHOPAEDIC SURGERY

## 2022-12-28 DEVICE — SCREW BNE L48MM CONSTRN CNDYL KNEE HEX HD STEM FOR LEG NXGN: Type: IMPLANTABLE DEVICE | Site: KNEE | Status: FUNCTIONAL

## 2022-12-28 DEVICE — IMPLANTABLE DEVICE: Type: IMPLANTABLE DEVICE | Site: KNEE | Status: FUNCTIONAL

## 2022-12-28 DEVICE — KNEE K1 TOT HEMI STD CEM IMPL CAPPED K1 ZIM: Type: IMPLANTABLE DEVICE | Site: KNEE | Status: FUNCTIONAL

## 2022-12-28 DEVICE — SCREW BNE L33MM CONSTRN CNDYL KNEE HEX HD STEM FOR LEG NXGN: Type: IMPLANTABLE DEVICE | Site: KNEE | Status: FUNCTIONAL

## 2022-12-28 DEVICE — PSN PK ART SURF INS TIP: Type: IMPLANTABLE DEVICE | Site: KNEE | Status: FUNCTIONAL

## 2022-12-28 DEVICE — CEMENT BNE 20ML 41GM FULL DOSE PMMA W/ TOBRA M VISC RADPQ: Type: IMPLANTABLE DEVICE | Site: KNEE | Status: FUNCTIONAL

## 2022-12-28 RX ORDER — BUPIVACAINE HYDROCHLORIDE 2.5 MG/ML
INJECTION, SOLUTION EPIDURAL; INFILTRATION; INTRACAUDAL PRN
Status: DISCONTINUED | OUTPATIENT
Start: 2022-12-28 | End: 2022-12-28 | Stop reason: HOSPADM

## 2022-12-28 RX ORDER — PROCHLORPERAZINE EDISYLATE 5 MG/ML
5 INJECTION INTRAMUSCULAR; INTRAVENOUS
Status: DISCONTINUED | OUTPATIENT
Start: 2022-12-28 | End: 2022-12-28 | Stop reason: HOSPADM

## 2022-12-28 RX ORDER — MEPERIDINE HYDROCHLORIDE 25 MG/ML
12.5 INJECTION INTRAMUSCULAR; INTRAVENOUS; SUBCUTANEOUS EVERY 5 MIN PRN
Status: DISCONTINUED | OUTPATIENT
Start: 2022-12-28 | End: 2022-12-28 | Stop reason: HOSPADM

## 2022-12-28 RX ORDER — ACETAMINOPHEN 325 MG/1
650 TABLET ORAL EVERY 6 HOURS
Status: CANCELLED | OUTPATIENT
Start: 2022-12-28

## 2022-12-28 RX ORDER — SODIUM CHLORIDE, SODIUM LACTATE, POTASSIUM CHLORIDE, CALCIUM CHLORIDE 600; 310; 30; 20 MG/100ML; MG/100ML; MG/100ML; MG/100ML
INJECTION, SOLUTION INTRAVENOUS CONTINUOUS
Status: DISCONTINUED | OUTPATIENT
Start: 2022-12-28 | End: 2022-12-28 | Stop reason: HOSPADM

## 2022-12-28 RX ORDER — SODIUM CHLORIDE 9 MG/ML
INJECTION, SOLUTION INTRAVENOUS CONTINUOUS
Status: DISCONTINUED | OUTPATIENT
Start: 2022-12-28 | End: 2022-12-28 | Stop reason: HOSPADM

## 2022-12-28 RX ORDER — LIDOCAINE HYDROCHLORIDE 10 MG/ML
INJECTION, SOLUTION EPIDURAL; INFILTRATION; INTRACAUDAL; PERINEURAL
Status: DISCONTINUED
Start: 2022-12-28 | End: 2022-12-28 | Stop reason: HOSPADM

## 2022-12-28 RX ORDER — MIDAZOLAM HYDROCHLORIDE 1 MG/ML
INJECTION INTRAMUSCULAR; INTRAVENOUS PRN
Status: DISCONTINUED | OUTPATIENT
Start: 2022-12-28 | End: 2022-12-28 | Stop reason: SDUPTHER

## 2022-12-28 RX ORDER — MAGNESIUM HYDROXIDE 1200 MG/15ML
LIQUID ORAL CONTINUOUS PRN
Status: DISCONTINUED | OUTPATIENT
Start: 2022-12-28 | End: 2022-12-28 | Stop reason: HOSPADM

## 2022-12-28 RX ORDER — SODIUM CHLORIDE 9 MG/ML
INJECTION, SOLUTION INTRAVENOUS PRN
Status: CANCELLED | OUTPATIENT
Start: 2022-12-28

## 2022-12-28 RX ORDER — SODIUM CHLORIDE 0.9 % (FLUSH) 0.9 %
5-40 SYRINGE (ML) INJECTION EVERY 12 HOURS SCHEDULED
Status: DISCONTINUED | OUTPATIENT
Start: 2022-12-28 | End: 2022-12-28 | Stop reason: HOSPADM

## 2022-12-28 RX ORDER — HYDRALAZINE HYDROCHLORIDE 20 MG/ML
10 INJECTION INTRAMUSCULAR; INTRAVENOUS
Status: DISCONTINUED | OUTPATIENT
Start: 2022-12-28 | End: 2022-12-28 | Stop reason: HOSPADM

## 2022-12-28 RX ORDER — ROPIVACAINE HYDROCHLORIDE 5 MG/ML
INJECTION, SOLUTION EPIDURAL; INFILTRATION; PERINEURAL PRN
Status: DISCONTINUED | OUTPATIENT
Start: 2022-12-28 | End: 2022-12-28 | Stop reason: SDUPTHER

## 2022-12-28 RX ORDER — SODIUM CHLORIDE 0.9 % (FLUSH) 0.9 %
5-40 SYRINGE (ML) INJECTION EVERY 12 HOURS SCHEDULED
Status: CANCELLED | OUTPATIENT
Start: 2022-12-28

## 2022-12-28 RX ORDER — ACETAMINOPHEN 500 MG
1000 TABLET ORAL ONCE
Status: COMPLETED | OUTPATIENT
Start: 2022-12-28 | End: 2022-12-28

## 2022-12-28 RX ORDER — OXYCODONE HCL 10 MG/1
10 TABLET, FILM COATED, EXTENDED RELEASE ORAL ONCE
Status: COMPLETED | OUTPATIENT
Start: 2022-12-28 | End: 2022-12-28

## 2022-12-28 RX ORDER — MIDAZOLAM HYDROCHLORIDE 1 MG/ML
INJECTION INTRAMUSCULAR; INTRAVENOUS
Status: COMPLETED
Start: 2022-12-28 | End: 2022-12-28

## 2022-12-28 RX ORDER — MORPHINE SULFATE 4 MG/ML
2 INJECTION, SOLUTION INTRAMUSCULAR; INTRAVENOUS
Status: CANCELLED | OUTPATIENT
Start: 2022-12-28

## 2022-12-28 RX ORDER — LISINOPRIL 10 MG/1
10 TABLET ORAL DAILY
Status: CANCELLED | OUTPATIENT
Start: 2022-12-28

## 2022-12-28 RX ORDER — ONDANSETRON 2 MG/ML
4 INJECTION INTRAMUSCULAR; INTRAVENOUS
Status: DISCONTINUED | OUTPATIENT
Start: 2022-12-28 | End: 2022-12-28 | Stop reason: HOSPADM

## 2022-12-28 RX ORDER — ROPIVACAINE HYDROCHLORIDE 5 MG/ML
INJECTION, SOLUTION EPIDURAL; INFILTRATION; PERINEURAL
Status: COMPLETED
Start: 2022-12-28 | End: 2022-12-28

## 2022-12-28 RX ORDER — LIDOCAINE HYDROCHLORIDE 20 MG/ML
INJECTION, SOLUTION INTRAVENOUS PRN
Status: DISCONTINUED | OUTPATIENT
Start: 2022-12-28 | End: 2022-12-28 | Stop reason: SDUPTHER

## 2022-12-28 RX ORDER — OXYCODONE HYDROCHLORIDE 5 MG/1
5 TABLET ORAL EVERY 4 HOURS PRN
Status: CANCELLED | OUTPATIENT
Start: 2022-12-28

## 2022-12-28 RX ORDER — LISINOPRIL 10 MG/1
10 TABLET ORAL DAILY
COMMUNITY

## 2022-12-28 RX ORDER — SODIUM CHLORIDE 9 MG/ML
INJECTION, SOLUTION INTRAVENOUS PRN
Status: DISCONTINUED | OUTPATIENT
Start: 2022-12-28 | End: 2022-12-28 | Stop reason: HOSPADM

## 2022-12-28 RX ORDER — SODIUM CHLORIDE 0.9 % (FLUSH) 0.9 %
5-40 SYRINGE (ML) INJECTION PRN
Status: DISCONTINUED | OUTPATIENT
Start: 2022-12-28 | End: 2022-12-28 | Stop reason: HOSPADM

## 2022-12-28 RX ORDER — PROPOFOL 10 MG/ML
INJECTION, EMULSION INTRAVENOUS CONTINUOUS PRN
Status: DISCONTINUED | OUTPATIENT
Start: 2022-12-28 | End: 2022-12-28 | Stop reason: SDUPTHER

## 2022-12-28 RX ORDER — MORPHINE SULFATE 4 MG/ML
4 INJECTION, SOLUTION INTRAMUSCULAR; INTRAVENOUS
Status: CANCELLED | OUTPATIENT
Start: 2022-12-28

## 2022-12-28 RX ORDER — ONDANSETRON 2 MG/ML
INJECTION INTRAMUSCULAR; INTRAVENOUS PRN
Status: DISCONTINUED | OUTPATIENT
Start: 2022-12-28 | End: 2022-12-28 | Stop reason: SDUPTHER

## 2022-12-28 RX ORDER — OXYCODONE HYDROCHLORIDE 5 MG/1
5 TABLET ORAL EVERY 6 HOURS PRN
Qty: 28 TABLET | Refills: 0
Start: 2022-12-28 | End: 2023-01-04

## 2022-12-28 RX ORDER — LABETALOL HYDROCHLORIDE 5 MG/ML
10 INJECTION, SOLUTION INTRAVENOUS
Status: DISCONTINUED | OUTPATIENT
Start: 2022-12-28 | End: 2022-12-28 | Stop reason: HOSPADM

## 2022-12-28 RX ORDER — OXYCODONE HYDROCHLORIDE 5 MG/1
5 TABLET ORAL
Status: DISCONTINUED | OUTPATIENT
Start: 2022-12-28 | End: 2022-12-28 | Stop reason: HOSPADM

## 2022-12-28 RX ORDER — SODIUM CHLORIDE 0.9 % (FLUSH) 0.9 %
5-40 SYRINGE (ML) INJECTION PRN
Status: CANCELLED | OUTPATIENT
Start: 2022-12-28

## 2022-12-28 RX ORDER — KETOROLAC TROMETHAMINE 30 MG/ML
INJECTION, SOLUTION INTRAMUSCULAR; INTRAVENOUS PRN
Status: DISCONTINUED | OUTPATIENT
Start: 2022-12-28 | End: 2022-12-28 | Stop reason: SDUPTHER

## 2022-12-28 RX ADMIN — PHENYLEPHRINE HYDROCHLORIDE 100 MCG: 10 INJECTION, SOLUTION INTRAMUSCULAR; INTRAVENOUS; SUBCUTANEOUS at 11:32

## 2022-12-28 RX ADMIN — LIDOCAINE HYDROCHLORIDE 100 MG: 20 INJECTION, SOLUTION INTRAVENOUS at 10:30

## 2022-12-28 RX ADMIN — PHENYLEPHRINE HYDROCHLORIDE 100 MCG: 10 INJECTION, SOLUTION INTRAMUSCULAR; INTRAVENOUS; SUBCUTANEOUS at 11:14

## 2022-12-28 RX ADMIN — PHENYLEPHRINE HYDROCHLORIDE 100 MCG: 10 INJECTION, SOLUTION INTRAMUSCULAR; INTRAVENOUS; SUBCUTANEOUS at 11:18

## 2022-12-28 RX ADMIN — HYDROMORPHONE HYDROCHLORIDE 0.5 MG: 1 INJECTION, SOLUTION INTRAMUSCULAR; INTRAVENOUS; SUBCUTANEOUS at 12:36

## 2022-12-28 RX ADMIN — SODIUM CHLORIDE, POTASSIUM CHLORIDE, SODIUM LACTATE AND CALCIUM CHLORIDE: 600; 310; 30; 20 INJECTION, SOLUTION INTRAVENOUS at 08:32

## 2022-12-28 RX ADMIN — ROPIVACAINE HYDROCHLORIDE 30 ML: 5 INJECTION, SOLUTION EPIDURAL; INFILTRATION; PERINEURAL at 08:59

## 2022-12-28 RX ADMIN — VANCOMYCIN HYDROCHLORIDE 1750 MG: 10 INJECTION, POWDER, LYOPHILIZED, FOR SOLUTION INTRAVENOUS at 08:36

## 2022-12-28 RX ADMIN — PROPOFOL 100 MCG/KG/MIN: 10 INJECTION, EMULSION INTRAVENOUS at 10:39

## 2022-12-28 RX ADMIN — HYDROMORPHONE HYDROCHLORIDE 0.5 MG: 1 INJECTION, SOLUTION INTRAMUSCULAR; INTRAVENOUS; SUBCUTANEOUS at 12:41

## 2022-12-28 RX ADMIN — KETOROLAC TROMETHAMINE 30 MG: 30 INJECTION, SOLUTION INTRAMUSCULAR at 11:59

## 2022-12-28 RX ADMIN — PHENYLEPHRINE HYDROCHLORIDE 100 MCG: 10 INJECTION, SOLUTION INTRAMUSCULAR; INTRAVENOUS; SUBCUTANEOUS at 11:52

## 2022-12-28 RX ADMIN — HYDROMORPHONE HYDROCHLORIDE 0.5 MG: 1 INJECTION, SOLUTION INTRAMUSCULAR; INTRAVENOUS; SUBCUTANEOUS at 12:24

## 2022-12-28 RX ADMIN — LIDOCAINE HYDROCHLORIDE 100 MG: 20 INJECTION, SOLUTION INTRAVENOUS at 11:20

## 2022-12-28 RX ADMIN — OXYCODONE HYDROCHLORIDE 10 MG: 10 TABLET, FILM COATED, EXTENDED RELEASE ORAL at 08:01

## 2022-12-28 RX ADMIN — LIDOCAINE HYDROCHLORIDE 100 MG: 20 INJECTION, SOLUTION INTRAVENOUS at 10:35

## 2022-12-28 RX ADMIN — TRANEXAMIC ACID 1000 MG: 100 INJECTION, SOLUTION INTRAVENOUS at 10:42

## 2022-12-28 RX ADMIN — PHENYLEPHRINE HYDROCHLORIDE 100 MCG: 10 INJECTION, SOLUTION INTRAMUSCULAR; INTRAVENOUS; SUBCUTANEOUS at 11:55

## 2022-12-28 RX ADMIN — PHENYLEPHRINE HYDROCHLORIDE 100 MCG: 10 INJECTION, SOLUTION INTRAMUSCULAR; INTRAVENOUS; SUBCUTANEOUS at 11:34

## 2022-12-28 RX ADMIN — MIDAZOLAM HYDROCHLORIDE 2 MG: 2 INJECTION, SOLUTION INTRAMUSCULAR; INTRAVENOUS at 08:44

## 2022-12-28 RX ADMIN — ONDANSETRON 4 MG: 2 INJECTION INTRAMUSCULAR; INTRAVENOUS at 10:42

## 2022-12-28 RX ADMIN — ACETAMINOPHEN 1000 MG: 500 TABLET, FILM COATED ORAL at 08:01

## 2022-12-28 RX ADMIN — MEPERIDINE HYDROCHLORIDE 12.5 MG: 25 INJECTION INTRAMUSCULAR; INTRAVENOUS; SUBCUTANEOUS at 12:24

## 2022-12-28 RX ADMIN — PHENYLEPHRINE HYDROCHLORIDE 100 MCG: 10 INJECTION, SOLUTION INTRAMUSCULAR; INTRAVENOUS; SUBCUTANEOUS at 11:40

## 2022-12-28 RX ADMIN — CEFAZOLIN 2000 MG: 2 INJECTION, POWDER, FOR SOLUTION INTRAMUSCULAR; INTRAVENOUS at 10:40

## 2022-12-28 RX ADMIN — PHENYLEPHRINE HYDROCHLORIDE 100 MCG: 10 INJECTION, SOLUTION INTRAMUSCULAR; INTRAVENOUS; SUBCUTANEOUS at 11:04

## 2022-12-28 ASSESSMENT — PAIN SCALES - GENERAL
PAINLEVEL_OUTOF10: 0
PAINLEVEL_OUTOF10: 7
PAINLEVEL_OUTOF10: 10
PAINLEVEL_OUTOF10: 4
PAINLEVEL_OUTOF10: 0
PAINLEVEL_OUTOF10: 0
PAINLEVEL_OUTOF10: 4
PAINLEVEL_OUTOF10: 0
PAINLEVEL_OUTOF10: 0
PAINLEVEL_OUTOF10: 2
PAINLEVEL_OUTOF10: 0
PAINLEVEL_OUTOF10: 0
PAINLEVEL_OUTOF10: 9
PAINLEVEL_OUTOF10: 4

## 2022-12-28 ASSESSMENT — PAIN DESCRIPTION - ORIENTATION
ORIENTATION: RIGHT

## 2022-12-28 ASSESSMENT — PAIN DESCRIPTION - LOCATION
LOCATION: KNEE

## 2022-12-28 ASSESSMENT — PAIN DESCRIPTION - DESCRIPTORS
DESCRIPTORS: DISCOMFORT
DESCRIPTORS: DISCOMFORT
DESCRIPTORS: DISCOMFORT;SORE
DESCRIPTORS: DISCOMFORT

## 2022-12-28 ASSESSMENT — PAIN DESCRIPTION - PAIN TYPE: TYPE: SURGICAL PAIN

## 2022-12-28 ASSESSMENT — PAIN DESCRIPTION - FREQUENCY: FREQUENCY: CONTINUOUS

## 2022-12-28 ASSESSMENT — ENCOUNTER SYMPTOMS: SHORTNESS OF BREATH: 1

## 2022-12-28 NOTE — PROGRESS NOTES
Physical Therapy  Facility/Department: [facility] GENERAL SURGERY  Physical Therapy Initial Assessment, Treatment and D/c    Name: Viviana Weinberg  : 1970  MRN: [id number]  Date of Service: 2022    Discharge Recommendations:   Viviana Weinberg scored a 18 /24 on the AM-PAC short mobility form. Current research shows that an AM-PAC score of 18 or greater is typically associated with a discharge to the patient's home setting. Please see assessment section for further patient specific details. If patient discharges prior to next session this note will serve as a discharge summary. Please see below for the latest assessment towards goals. PT Equipment Recommendations  Equipment Needed: Yes  Mobility Devices: Dearl Stalling: Rolling      Patient Diagnosis(es): The encounter diagnosis was Osteoarthritis of right knee, unspecified osteoarthritis type. Past Medical History:  has a past medical history of Anxiety, COVID, COVID-19 long hauler, and Hypertension. Past Surgical History:  has a past surgical history that includes Vasectomy; Carpal tunnel release; Tonsillectomy; Colonoscopy (2018); Upper gastrointestinal endoscopy (2018); Endoscopy, colon, diagnostic; Knee arthroscopy w/ meniscal repair (Right); and Randall tooth extraction. Assessment   Assessment: Pt is 44 yo M admitted for R uni knee. Moving well. Rec 24 hour assist and PT. Rec wheeled walker. D/c PT. Therapy Prognosis: Good  Decision Making: Low Complexity  Requires PT Follow-Up: Yes  Activity Tolerance  Activity Tolerance: Patient tolerated treatment well     Plan   Safety Devices  Type of Devices: Left in bed, Call light within reach, Nurse notified     Restrictions  Position Activity Restriction  Other position/activity restrictions: FWBAT     Subjective   Pain: Pain in R knee, RN notified  General  Chart Reviewed:  Yes  Additional Pertinent Hx: Pt admitted on 22 for R unicompartmental knee replacement  Family / Caregiver Present: No  Referring Practitioner: Mary BURDEN  Diagnosis: R knee OA  Subjective  Subjective: Pt supine in bed and agreeable to PT         Social/Functional History  Social/Functional History  Lives With: Significant other  Type of Home: Condo  Home Layout: Two level  Home Access: Stairs to enter with rails  Entrance Stairs - Number of Steps: 8, 13, 8  Bathroom Shower/Tub: Tub only (Jaclillii)  Bathroom Toilet: Standard  Bathroom Equipment: Shower chair  Home Equipment: Crutches  Has the patient had two or more falls in the past year or any fall with injury in the past year?: No  ADL Assistance: Independent  Homemaking Assistance: Independent  Ambulation Assistance: Independent  Active : Yes  Occupation: Full time employment, Part time employment  Type of Occupation: Heating and air  Additional Comments: Denies falls  Vision/Hearing  Vision  Vision Exceptions: Wears glasses at all times  Hearing  Hearing Exceptions: Hard of hearing/hearing concerns    Cognition   Orientation  Overall Orientation Status: Within Normal Limits  Cognition  Cognition Comment: Pt impulsive     Objective   Heart Rate: 65  Heart Rate Source: Monitor  BP: (!) 140/83  BP Location: Right upper arm  BP Method: Automatic  Patient Position: Supine  MAP (Calculated): 102  Resp: 13  SpO2: 98 %  O2 Device: None (Room air)              AROM RLE (degrees)  RLE General AROM: 8-85  AROM LLE (degrees)  LLE AROM : WNL  Strength RLE  Comment: >3/5  Strength LLE  Strength LLE: WNL        Balance  Sitting: Intact  Standing:  (CGA)  Gait  Overall Level of Assistance:  (CGA using RW; CG/Min A using crutches - mod cues for safety/sequencing/negotiation.  Encouraged use of walker.)  Bed mobility  Supine to Sit: Stand by assistance  Sit to Supine: Stand by assistance  Scooting: Stand by assistance  Transfers  Sit to Stand: Contact guard assistance  Stand to Sit: Contact guard assistance  Ambulation  Device: Rolling Walker  Assistance: Contact guard assistance;Stand by assistance  Gait Deviations: Slow Emma;Decreased step length;Decreased step height  Distance: 25 feet x 2  Ambulation 2  Device 2: Axillary Crutches  Assistance 2: Contact guard assistance;Minimal assistance  Quality of Gait 2: Heavy cues needed for WBing and correct sequence  Distance: 60 feet  Stairs  # Steps : 14  Stairs Height: 6\"  Rails: Left ascending  Curbs: 6\"  Device: Crutches  Assistance: Contact guard assistance; Moderate assistance  Comment: 1 LOB with stairs requiring mod assist        Exercise Treatment: Reviewed TKR HEP and pt demonstrated understanding        OutComes Score                                                  AM-PAC Score             Tinneti Score       Goals          Education  Patient Education  Education Given To: Patient  Education Provided: Role of Therapy;Home Exercise Program  Education Outcome: Verbalized understanding      Therapy Time   Individual Concurrent Group Co-treatment   Time In  1345         Time Out  1430         Minutes  45             Timed Code Treatment Minutes:   30    Total Treatment Minutes:   Pablo Jacob PT

## 2022-12-28 NOTE — PROGRESS NOTES
Pt received from OR to PACU # 2 via stretcher. Post:  R medial knee arthroplasty     Report received from OR RN and SARAH Hemphill CRNA. Per report pt did well, received tordol. Respirations reg and easy. Pt is very drowsy. Attached to PACU monitoring system. Alarms and parameters set    Pain none noted and no complaints of nausea.

## 2022-12-28 NOTE — ANESTHESIA POSTPROCEDURE EVALUATION
Department of Anesthesiology  Postprocedure Note    Patient: Jordan Bender  MRN: [id number]  YOB: 1970  Date of evaluation: 12/28/2022      Procedure Summary     Date: 12/28/22 Room / Location: [address]    Anesthesia Start: 1033 Anesthesia Stop: 1214    Procedure: RIGHT MEDIAL UNI KNEE ARTHROPLASTY (Right) Diagnosis:       Osteoarthritis of right knee, unspecified osteoarthritis type      (Osteoarthritis of right knee, unspecified osteoarthritis type [M17.11])    Surgeons: Eunice Ramos MD Responsible Provider: Romayne Libra, MD    Anesthesia Type: regional, epidural ASA Status: 2          Anesthesia Type: No value filed.     Mago Phase I: Mago Score: 9    Mago Phase II:        Anesthesia Post Evaluation    Patient location during evaluation: PACU  Patient participation: complete - patient participated  Level of consciousness: awake and alert  Airway patency: patent  Nausea & Vomiting: no nausea and no vomiting  Complications: no  Cardiovascular status: hemodynamically stable  Respiratory status: acceptable  Hydration status: euvolemic  Multimodal analgesia pain management approach

## 2022-12-28 NOTE — PROGRESS NOTES
Pt states he has already picked up his prescriptions from his [address] - including Oxycodone and Eliquis

## 2022-12-28 NOTE — PROGRESS NOTES
Ambulatory Surgery/Procedure Discharge Note    Vitals:    12/28/22 1438   BP: (!) 133/91   Pulse: 63   Resp: 16   Temp: 97.2 °F (36.2 °C)   SpO2: 94%     Patient meets criteria for discharge per Mago score. In: 3972 [P.O.:120; I.V.:1650]  Out: 300 [Urine:250]    Restroom use offered before discharge. Yes    Pain assessment:  present - adequately treated  Pain Level: 2    Pt and S.O. states \"ready to go home\". Pt alert and oriented x4. IV removed. Denies N/V or pain. Right leg dressing-C,D,I. Voided prior to discharge. Discharge instructions given to pt and SO with pt permission. Pt and SO verbalized understanding of all instructions. Left with all belongings, walker, and discharge instructions. Patient picked up his prescriptions on Monday. Patient discharged to home/self care.  Patient discharged via wheel chair by transporter to waiting S.O.       12/28/2022 3:25 PM

## 2022-12-28 NOTE — PROGRESS NOTES
Occupational Therapy  Facility/Department: [address] [place] [address]  Occupational Therapy Initial Assessment and Treatment  Discharge      Name: Panchito Rodriguez  : 1970  MRN: [id number]  Date of Service: 2022    Discharge Recommendations:  Panchito Rodriguez scored a 21/24 on the AM-PAC ADL Inpatient form. Current research shows that an AM-PAC score of 18 or greater is typically associated with a discharge to the patient's home setting. OT Equipment Recommendations  Equipment Needed: No       Patient Diagnosis(es): The encounter diagnosis was Osteoarthritis of right knee, unspecified osteoarthritis type. Past Medical History:  has a past medical history of Anxiety, COVID, COVID-19 long hauler, and Hypertension. Past Surgical History:  has a past surgical history that includes Vasectomy; Carpal tunnel release; Tonsillectomy; Colonoscopy (2018); Upper gastrointestinal endoscopy (2018); Endoscopy, colon, diagnostic; Knee arthroscopy w/ meniscal repair (Right); and [place] tooth extraction. Assessment   Assessment: Seen POD#0 s/p R Medial Uni Knee Arthroplasty. [facility] for functional transfer/mobility and ADLs (encouraging use of walker rather than crutches). Pt is impulsive at times. Recommend 24 hr Assist. Pt plans to return home today w/ initial 24 hr A of girlfriend. Educated pt in modified ADL and transfers techniques and demo learning. No acute care goals set. Pt to discharge home. No DME needs. Decision Making: Low Complexity  REQUIRES OT FOLLOW-UP: No  Activity Tolerance  Activity Tolerance: Patient Tolerated treatment well        Plan   Occupational Therapy Plan  Discharge acute OT. Restrictions  Position Activity Restriction  Other position/activity restrictions: FWBAT    Subjective   General  Chart Reviewed: Yes  Additional Pertinent Hx: 45 y.o. M to OR  for RIGHT MEDIAL UNI KNEE ARTHROPLASTY.      PMH: Anxiety, COVID-19, HTN, CTR, Vascectomy. Family / Caregiver Present: No  Referring Practitioner: JENISE Singer  Diagnosis: OA R Knee    Subjective  Subjective: Seen in PACU. Plans to discharge home w/ 24 hr A of girlfriend. Pain in R knee, RN notified    Social/Functional History  Social/Functional History  Lives With: Significant other  Type of Home: Condo  Home Layout: Two level  Home Access: Stairs to enter with rails  Entrance Stairs - Number of Steps: 8, 13, 8  Bathroom Shower/Tub: Tub only (Jaclillii)  Bathroom Toilet: Standard  Bathroom Equipment: Shower chair  Home Equipment: Crutches  Has the patient had two or more falls in the past year or any fall with injury in the past year?: No  ADL Assistance: Independent  Homemaking Assistance: Independent  Ambulation Assistance: Independent  Active : Yes  Occupation: Full time employment, Part time employment  Type of Occupation: Heating and air  Additional Comments: Denies falls       Objective                  Safety Devices  Type of Devices: Left in bed;Call light within reach;Nurse notified    Balance  Sitting: Intact  Standing:  (CGA)    Gait  Overall Level of Assistance:  (CGA using RW; CG/Min A using crutches - mod cues for safety/sequencing/negotiation. Encouraged use of walker.)    Toilet Transfers  Toilet - Technique: Ambulating  Equipment Used: Standard toilet  Toilet Transfer: Contact guard assistance    Tub Transfers  Tub Transfers Comments: Note: pt has garden tub - intends to sit down low in tub and have LE extended over tub. Discussed spongebathing intially and possible need for shower chair. AROM: Within functional limits  Strength: Within functional limits  Coordination: Within functional limits    ADL  Grooming: Contact guard assistance (washing hands at sink level, standing)  UE Dressing: Setup (seated)  LE Dressing: Verbal cueing; Increased time to complete;Contact guard assistance  Toileting:  (denied need to use)  Additional Comments: Note: impulsive at times       Activity Tolerance  Activity Tolerance: Patient tolerated treatment well    Bed mobility  Supine to Sit: Stand by assistance  Sit to Supine: Stand by assistance  Scooting: Stand by assistance    Transfers  Sit to stand: Contact guard assistance  Stand to sit: Contact guard assistance    Vision  Vision Exceptions: Wears glasses at all times  Hearing  Hearing: Within functional limits  Hearing Exceptions: Hard of hearing/hearing concerns  Cognition  Cognition Comment: Pt impulsive  Orientation  Overall Orientation Status: Within Normal Limits                    Education Given To: Patient  Education Provided: Role of Therapy;Plan of Care;ADL Adaptive Strategies;Precautions;Transfer Training; Fall Prevention Strategies  Education Method: Verbal  Barriers to Learning: None  Education Outcome: Verbalized understanding                      Pt seen by OT for eval and treat.  Treatment included: bed mobility, functional transfer/mobility, ADL, pt education                                                      AM-PAC Score        AM-PAC Inpatient Daily Activity Raw Score: 21 (12/28/22 1423)  AM-PAC Inpatient ADL T-Scale Score : 44.27 (12/28/22 1423)  ADL Inpatient CMS 0-100% Score: 32.79 (12/28/22 1423)  ADL Inpatient CMS G-Code Modifier : CJ (12/28/22 1423)              Therapy Time   Individual Concurrent Group Co-treatment   Time In 1344         Time Out 1423         Minutes 39          Timed Code Treatment Minutes:   24    Total Treatment Minutes:  10 [facility] OTR/L #[id number]

## 2022-12-28 NOTE — ANESTHESIA PRE PROCEDURE
Department of Anesthesiology  Preprocedure Note       Name:  Teo Bland   Age:  45 y.o.  :  1970                                          MRN:  [id number]         Date:  2022      Surgeon: James Moreno):  Gregg Garcia MD    Procedure: Procedure(s):  RIGHT MEDIAL UNI KNEE ARTHROPLASTY    Medications prior to admission:   Prior to Admission medications    Medication Sig Start Date End Date Taking? Authorizing Provider   lisinopril (PRINIVIL;ZESTRIL) 10 MG tablet Take 10 mg by mouth daily   Yes Historical Provider, MD   clonazePAM (KLONOPIN) 0.5 MG tablet TAKE 1 TABLET BY MOUTH TWICE DAILY 22   Historical Provider, MD       Current medications:    Current Facility-Administered Medications   Medication Dose Route Frequency Provider Last Rate Last Admin    lactated ringers infusion   IntraVENous Continuous Gregg Garcia MD        ceFAZolin (ANCEF) 2,000 mg in sodium chloride 0.9 % 50 mL IVPB (mini-bag)  2,000 mg IntraVENous Once Gregg Garcia MD        vancomycin (VANCOCIN) 1,750 mg in dextrose 5 % 500 mL IVPB  15 mg/kg IntraVENous Once Gregg Garcia  mL/hr at 22 0836 1,750 mg at 22 0836    tranexamic acid (CYKLOKAPRON) 1,000 mg in sodium chloride 0.9 % 60 mL IVPB  1,000 mg IntraVENous Once Gregg Garcia MD        lactated ringers infusion   IntraVENous Continuous [place] K[place]an[place],  mL/hr at 22 0832 New Bag at 22 0832    midazolam (VERSED) 2 MG/2ML injection             ropivacaine (NAROPIN) 0.5% injection             lidocaine PF 1 % injection                Allergies:     Allergies   Allergen Reactions    Caffeine Other (See Comments)     Heart Palp    Prednisone Other (See Comments)     Steroid injection in knee caused tachycardia, pt states that oral prednisone does not cause this    Betamethasone Palpitations and Other (See Comments)     Increased heart rate       Problem List:    Patient Active Problem List   Diagnosis Code    Other chest pain R07.89    Hypertension I10    Hyperlipidemia E78.5    Impaired glucose metabolism R73.09    Anxiety F41.9    Non-seasonal allergic rhinitis due to pollen J30.1    Pulmonary nodules R91.8    Myalgia M79.10    Numbness and tingling in right hand R20.0, R20.2    Vitamin D deficiency E55.9    Muscle weakness of right upper extremity C51.22    Folic acid deficiency A49.9    Degenerative cervical spinal stenosis M48.02    Tachycardia R00.0    Shortness of breath R06.02       Past Medical History:        Diagnosis Date    Anxiety     COVID     COVID-19 long hauler     Hypertension        Past Surgical History:        Procedure Laterality Date    CARPAL TUNNEL RELEASE      COLONOSCOPY  07/2018    no polyps--rpt in 10 y    ENDOSCOPY, COLON, DIAGNOSTIC      KNEE ARTHROSCOPY W/ MENISCAL REPAIR Right     TONSILLECTOMY      UPPER GASTROINTESTINAL ENDOSCOPY  07/2018    Esophageal dilatation-gastritis/esophagitis    VASECTOMY      WISDOM TOOTH EXTRACTION         Social History:    Social History     Tobacco Use    Smoking status: Former    Smokeless tobacco: Former   Substance Use Topics    Alcohol use: Not Currently                                Counseling given: Not Answered      Vital Signs (Current):   Vitals:    12/28/22 0846 12/28/22 0850 12/28/22 0855 12/28/22 0900   BP: (!) 140/97 (!) 141/86 125/84 116/77   Pulse: 76 86 85 88   Resp: 17 19 19 20   Temp:       TempSrc:       SpO2: 98% 98% 98% 97%   Weight:       Height:                                                  BP Readings from Last 3 Encounters:   12/28/22 116/77   08/22/22 (!) 144/92   05/17/22 134/88       NPO Status: Time of last liquid consumption: 0805 (sip with meds)                        Time of last solid consumption: 2330                        Date of last liquid consumption: 12/28/22                        Date of last solid food consumption: 12/27/22    BMI:   Wt Readings from Last 3 Encounters: 12/28/22 234 lb 9.6 oz (106.4 kg)   08/22/22 232 lb (105.2 kg)   05/17/22 219 lb 3.2 oz (99.4 kg)     Body mass index is 33.66 kg/m².     CBC:   Lab Results   Component Value Date/Time    WBC 7.3 03/15/2022 01:52 PM    RBC 5.56 03/15/2022 01:52 PM    HGB 16.6 03/15/2022 01:52 PM    HCT 49.1 03/15/2022 01:52 PM    MCV 88.4 03/15/2022 01:52 PM    RDW 15.1 03/15/2022 01:52 PM     03/15/2022 01:52 PM       CMP:   Lab Results   Component Value Date/Time     08/22/2022 10:45 AM    K 4.2 08/22/2022 10:45 AM     08/22/2022 10:45 AM    CO2 23 08/22/2022 10:45 AM    BUN 18 08/22/2022 10:45 AM    CREATININE 1.1 08/22/2022 10:45 AM    GFRAA >60 08/22/2022 10:45 AM    GFRAA >60 01/25/2010 02:15 PM    AGRATIO 1.6 08/22/2022 10:45 AM    LABGLOM >60 08/22/2022 10:45 AM    GLUCOSE 102 08/22/2022 10:45 AM    PROT 6.9 08/22/2022 10:45 AM    CALCIUM 9.2 08/22/2022 10:45 AM    BILITOT 0.4 08/22/2022 10:45 AM    ALKPHOS 72 08/22/2022 10:45 AM    AST 18 08/22/2022 10:45 AM    ALT 21 08/22/2022 10:45 AM       POC Tests:   Recent Labs     12/28/22  0813   POCGLU 117*       Coags:   Lab Results   Component Value Date/Time    PROTIME 11.2 01/25/2010 02:15 PM    INR 1.06 01/25/2010 02:15 PM    APTT 29.1 01/25/2010 02:15 PM       HCG (If Applicable): No results found for: PREGTESTUR, PREGSERUM, HCG, HCGQUANT     ABGs: No results found for: PHART, PO2ART, CVC5VJO, UTB9XVO, BEART, G6EYQPTL     Type & Screen (If Applicable):  No results found for: LABABO, LABRH    Drug/Infectious Status (If Applicable):  No results found for: HIV, HEPCAB    COVID-19 Screening (If Applicable): No results found for: COVID19        Anesthesia Evaluation  Patient summary reviewed and Nursing notes reviewed no history of anesthetic complications:   Airway: Mallampati: II  TM distance: >3 FB   Neck ROM: full  Mouth opening: > = 3 FB   Dental: normal exam         Pulmonary:normal exam    (+) shortness of breath: Cardiovascular:    (+) hypertension:,                   Neuro/Psych:   Negative Neuro/Psych ROS              GI/Hepatic/Renal: Neg GI/Hepatic/Renal ROS            Endo/Other: Negative Endo/Other ROS                    Abdominal:             Vascular: negative vascular ROS. Other Findings:           Anesthesia Plan      regional and epidural     ASA 2     (Right adductor canal nerve block for post op pain control per surgeon request)      MIPS: Postoperative opioids intended and Prophylactic antiemetics administered. Anesthetic plan and risks discussed with patient. Plan discussed with CRNA.     Attending anesthesiologist reviewed and agrees with Preprocedure content      Post-op pain plan if not by surgeon: single peripheral nerve block            Dori Arriaga MD   12/28/2022

## 2022-12-28 NOTE — ANESTHESIA PROCEDURE NOTES
Peripheral Block    Patient location during procedure: pre-op  Reason for block: post-op pain management and at surgeon's request  Start time: 12/28/2022 8:57 AM  End time: 12/28/2022 9:00 AM  Staffing  Performed: anesthesiologist   Anesthesiologist: Melissa Tuttle MD  Preanesthetic Checklist  Completed: patient identified, IV checked, site marked, risks and benefits discussed, surgical/procedural consents, equipment checked, pre-op evaluation, timeout performed, anesthesia consent given, oxygen available and monitors applied/VS acknowledged  Peripheral Block   Patient position: supine  Prep: ChloraPrep  Provider prep: mask and sterile gloves  Patient monitoring: cardiac monitor, continuous pulse ox, frequent blood pressure checks and IV access  Block type: Femoral  Adductor canal  Laterality: right  Injection technique: single-shot  Guidance: ultrasound guided  Local infiltration: lidocaine  Infiltration strength: 1 %  Local infiltration: lidocaine    Needle   Needle type: insulated echogenic nerve stimulator needle   Needle gauge: 22 G  Needle localization: ultrasound guidance  Needle length: 5 cm  Assessment   Injection assessment: negative aspiration for heme, no paresthesia on injection and local visualized surrounding nerve on ultrasound  Slow fractionated injection: yes  Hemodynamics: stable  Real-time US image taken/store: yes  Outcomes: uncomplicated and patient tolerated procedure well    Additional Notes  Sartorius and Vastus Medialis Muscle, Femoral artery and Saphenous nerve are identified; the tip of the needle and the spread of the local anesthetic around the Saphenous nerve are visualized. The Saphenous nerve appeared to be anatomically normal and there were no abnormal pathologically findings seen.

## 2022-12-28 NOTE — OP NOTE
PREOPERATIVE DIAGNOSIS(ES): Right knee medial arthrosis. POSTOPERATIVE DIAGNOSIS(ES): Right knee medial arthrosis. PROCEDURE(S) PERFORMED:  Right medial unicompartmental knee  arthroplasty. SURGEON: Gayle Olivier MD     FIRST SURGICAL ASSISTANT:  Charisse Metzger, [facility]. The Physician Assistant was necessary to perform the surgery today by assisting with application of implants and manipulation of the extremity. This help was not otherwise available in the operating room today. ANESTHESIA: Epidural + Adductor Canal Block + Local Marcaine     ESTIMATED BLOOD LOSS: 50 mL. URINE OUTPUT: 0.     INTRAVENOUS FLUIDS: 800 mL of crystalloid. COMPLICATIONS: None. SPECIMENS: None. IMPLANTS:   1. Lambert PPK femoral component size 6, Right medial.   2. Lambert PPK tibia tray size H, Right medial.   3. Tibial insert, 10 mm     OPERATIVE INDICATIONS: This patient has longstanding knee pain. I initially attempted to manage their symptoms nonoperatively. They failed  nonoperative management and presented to my clinic with continued  symptoms requesting surgical consultation. The patient localized their pain  all to the medial aspect of the joint. They had no anterior pain with patellar  compression or squat testing. Radiographically, they had medial arthrosis. Based on this, I offered the patient a unicompartmental knee arthroplasty. I  did discuss with patient prior to the operation that although on examination there was a firm end point on Lachman examination suggesting the ACL was intact, that if I  encountered an ACL deficient knee or excessive arthrosis of the  patellofemoral or lateral compartments that the surgery would be converted to  a total knee arthroplasty. We discussed the risks and benefits of  unicompartmental knee arthroplasty, total knee arthroplasty and reasonable  alternatives to management.  We did discuss failure of unicompartmental knee  arthroplasty and conversion to total knee arthroplasty in the event that  there is failure. Despite these risks, the patient did elect to proceed with   unicompartmental knee arthroplasty. We discussed the risks and benefits of each. The patient  was interested in unicompartmental knee arthroplasty and did elect to  proceed. DETAILS OF PROCEDURE: The patient was greeted in the preoperative  holding area. The operative extremity was marked with a marker. The patient was transported to the operating room where general anesthesia was obtained. The patient was placed supine with a bump under the operative hip. A tourniquet was applied to the thigh. All bony  prominences were well padded. The operative extremity was then prepped and  draped in a normal standard sterile surgical fashion. A final time-out was  performed, verifying correct patient, operative site and operative plan. The extremity was exanguinated and the tourniquet inflated. An incision, based of the medial aspect of the tibial tubercle directed proximally was made with a knife. I dissected the subcutaneous tissue, exposing the capsule and extensor retinaculum. I entered the  intra-articular space with a knife and cut the anterior horn of the medial  meniscus and performed a slight medial release anteriorly to gain exposure to  the proximal tibia. I removed medial fat pad. I placed a retractor over  the lateral aspect of the knee and evaluated the patellofemoral and lateral  compartments. There was good cartilage, and I felt that proceeding with  medial unicompartmental knee arthroplasty was a reasonable option in this  circumstance. I also evaluated the anterior cruciate ligament which was  intact. I then utilized the extramedullary tibia alignment guide to make a tibia resection in neutral alignment with 5 degrees of posterior slope. The bone was removed. I sized the tibia to a size H.   The I then place the distal femur guide perpendicular to my tibia cut and pinned it into place followed by my distal resection. I placed the knee in flexion and placed the size 6 femoral component in rotation perpendicular to my tibia cut. This was pinned and placed followed by posterior, posterior chamfer and anterior chamfer cuts. I drilled the two lug holes. The removed bone for the nose of the femoral implant. All bone was removed. The knee was placed in extension and the meniscus was removed. The bipolar sealer was utilized on the posterior structures. I placed my trial femur and the lollipop and had balanced flexion and extension gaps. I removed the trials and prepped the keel and lug holes for the tibia implant. Implants were opened on the back table as the wound was copiously irrigated with pulsatile lavage. Cement was mixed on the back table and implants were cemented into place with a trial poly. The knee was placed in 90 degrees of flexion while the cement cured. I trialed multiple poly liners and chose the 10 mm with the appropriate flexion and extension balance. This poly was opened and impacted into place. I copiously irrigated the wound with a liter of sterile saline infiltrated with bacitracin. I then turned my attention to closure of the wound. The capsule and extensor mechanism was closed with interrupted 0 vicryl oversewn with a running #2 stratafix suture. The tourniquet was deflated and IV tranexamic acid was injected and a local anesthetic mixture was injected into the quadriceps muscle and capsule followed by a 0 vicryl in the subcutaneous tissue. 2-0 vicryl was placed in a buried interrupted fashion followed by a running subcuticular 4-0 monocryl in the subdermal layer. Surgical adhesive was placed on the incision. A sterile silver impregnated occlusive was placed followed by cast padding and an Ace bandage. The patient was extubated, transferred to a hospital bed and transported to the post-operative anesthesia care unit in stable condition.     All sponge and needle counts were correct x2.

## 2022-12-28 NOTE — PROGRESS NOTES
Anesthesia Dr. Lacy Tan here to do Epidural and Right Adductor Canal block. O2 placed 2L N/C  Start PBPV:2038 0852 Epidural Test Dose Given via Dr. Leah Sullivan Right Adductor Canal Block started  Stop time:0900  Tolerated Well    See flow sheet for vital signs.  Pt talking with girlfriend at bedside

## 2022-12-28 NOTE — DISCHARGE INSTRUCTIONS
[place] [facility]THOPAEDICS DISCHARGE ORDER SET  Unicompartmental Knee Replacement    Dr. Allison Ramirez M.D.  [phone]    1. ( x )  Post Discharge Instructions for HOME/SNF   Elevate extremity if swelling occurs  Continue the exercise program as prescribed by PT  Use walker, crutches or cane with weightbearing instructions as indicated by Dr. Santillan Gain not ambulate without assistance until cleared to do so by PT  Use Spirometer every 2 hrs while awake    2. ( x ) Initiate bowel care with the following medications   Over-the-Counter Senokot  (or Over-the-Counter Colace (Docusate Sodium)  1-2 tabs by mouth twice daily, continue while on narcotics, hold for loose stools. 3. ( x  ) Admit s/p   ( x  ) right    (   ) left    ( x ) Minimally Invasive UKA    4. ( x  ) Physical Therapy Orders    Range-of-Motion, strengthening, gait training, ADL's. May discontinue therapy when full extension is obtained and flexion is greater than 120 degrees. (      )  Home physical therapy 2 times per week for 3 weeks until follow-up in my clinic. Will transition to outpatient physical therapy after follow-up. (    x  ) Outpatient physical therapy 3 times per week for 6 weeks. 5. ( x )  Weight bearing/limitations      ( x  )right  (   ) left    ( x  ) lower extremity        ( x ) Full weight bearing  (   ) Non Weight Bearing   (   ) partial WB-         %      6.( x  ) Dressing/wound care    You may remove your ACE bandage on post-op day #1. It is placed for compression for the first 24 hours post-op. You may loosen it if it becomes too tight. ( x ) Remove Mepilex (the large clear dressing with a silver strip in the middle) dressing on post-op day 7.     - It is important to keep your incision covered for 2 weeks after surgery. After the Mepilex (large clear dressing) dressing is removed on post-op day 7, cover your incision with sterile gauze and tegaderm for another 7 days.  You may change this dressing as needed when moist/wet. - There is a mesh called Prineo underneath the larger dressing. This mesh is surgically glued over your incision. Leave this mesh in place until you see Dr. Josh Luther in the office for your 3 week post-operative appointment. (  ) Remove Prevena on post-operative day # 7. The battery pack attached to the purple dressing will automatically die 7 days after your surgery. A week after your surgery, peel off the dressing like you would a large band-aid and the entire dressing and battery pack may be thrown in the garbage.   - It is important to keep your incision covered for 2 weeks after surgery. After the Purple Prevena dressing is removed on post-op day 7, cover your incision with sterile gauze and tegaderm for another 7 days. You may change this dressing as needed when moist/wet. - If staples were used to close your incision: Staples are safe and may remain for up to 4 weeks post-operatively. These will be removed at your 3 week post-operative appointment. You may shower. No tub baths. Do not scrub wound. Pat dry with soft towel. Jacinta Luther does not utilize home healthcare or home nursing. It is not needed after this procedure. 7. (  x ) DVT PROPHYLAXIS -   (  x ) Thigh/knee high mo hose bilateral lower extremities, OFF AT NIGHT  (   ) Aspirin 81 mg daily for 4 weeks  (  x) Eliquis (Apixaban) 2.5 mg tablets 2 times a day for 4 weeks  (  )  Xarelto (Rivaroxaban) 10 mg daily for 4 weeks  (   ) If you are taking Xarelto (Rivaroxaban) or Eliquis (Apixaban), start taking Aspirin 325mg 2 x daily the day after you finish your Xarelto (Rivaroxaban) or Eliquis (Apixaban). Continue Aspirin until 6 weeks post op. (   ) Patient's who were on coumadin prior to surgery should resume their pre op dose  and discontinue lovenox when INR = 2.0      8. Take all medications ordered from Dr. Jasmine Ridley office as directed at your Pre-op Appointment.     [phone]  Follow up appointment with Dr. Andie Montejo in 3 weeks    ELECTRONICALLY SIGNED BY: JENISE Boss, 12/28/2022     [facility]    There are potential side effects of anesthesia or sedation you may experience for the first 24 hours. These side effects include:    Confusion or Memory loss, Dizziness, or Delayed Reaction Times   [x]A responsible person should be with you for the next 24 hours. Do not operate any vehicles (automobiles, bicycles, motorcycles) or power tools or machinery for 24 hours. Do not sign any legal documents or make any legal decisions for 24 hours. Do not drink alcohol for 24 hours or while taking narcotic pain medication. Nausea    [x]Start with light diet and progress to your normal diet as you feel like eating. However, if you experience nausea or repeated episodes of vomiting which persist beyond 12-24 hours, notify your physician. Once nausea has passed, remember to keep drinking fluids. Difficulty Passing Urine  [x]Drink extra amounts of fluid today. Notify your physician if you have not urinated within 8 hours after your procedure or you feel uncomfortable. Irritated Throat from a Breathing Tube  [x]Drink extra amounts of fluid today. Lozenges may help. Muscle Aches  [x]You may experience some generalized body aches as your muscles recover from medications used to relax them during surgery. These will gradually subside. MEDICATION INSTRUCTIONS:  []Prescription(S) x     sent with you. Use as directed. When taking pain medications, you may experience the side effect of dizziness or drowsiness. Do not drink alcohol or drive when taking these medications. []Prescription(S) x          Called to Pharmacy Name and location:    Picked them up on Monday    [x]Give the list of your medications to your primary care physician on your next visit.  Keep your med list updated and carry it with in case of emergencies. [x] Narcotic pain medications can cause the side effect of significant constipation. You may want to add a stool softener to your postoperative medication schedule or speak to your surgeon on how best to manage this side effect. NARCOTIC SAFETY:  Your pain medicine is only for you to take. Safely store your medicines. Store pills up high and out of reach of children and pets. Ensure safety caps are snapped tightly  Keep track of how many pills you have left    Unused medication can be disposed of by taking them to a drop-off box or take-back program that is authorized by the [facility]. Access to a site near you can be found on the [facility] [employer] website ([address]. [facility]). If you have a CPAP machine, it is very important that you use it daily during all periods of sleep and daytime rest during your recovery at home. Surgery and Anesthesia place a significant amount of stress on your body. Using your CPAP will help keep you safe and lessen the negative effects of that stress. FOLLOW-UP RECOVERY CARE:  [x]Call the office at [phone] for follow-up appointment and problems    Watch for these possible complications, symptoms, or side effects of anesthesia. Call physician if they or any other problems occur:  Signs of INFECTION   > Fever over 101°     > Redness, swelling, hardness or warmth at the operative site   >Foul smelling or cloudy drainage at the operative site   Unrelieved PAIN  Unrelieved NAUSEA  Blood soaked dressing. (Some oozing may be normal)  Inability to urinate      Numb, pale, blue, cold or tingling extremity      Physician:  Dr. Cook Levels    The above instructions were reviewed with patient/significant other.   The following additional patient specific information was reviewed with the patient/significant other:  [x]Procedure/physician specific instructions  [x]Medication information sheet(S) including potential side effects  []Lydia be test  []Pain Ball management  []FAQ Catheter associated blood stream infections  []FAQ Surgical Site Infections  []Other-    I have read and understand the instructions given to me: ____________________________________________   (Patient/S.O. Signature)            Date/time 12/28/2022 12:02 PM         PACU:  [phone]   M-F 700 AM - 7 PM      SAME DAY SERVICES:  [phone] M-F 7AM-6PM        If you smoke STOP. We care about your health! PERIPHERAL NERVE BLOCK INSTRUCTIONS     Please remember while having a nerve block you are at an increased risk for [address] were given a nerve block today from the anesthesiologist. Most nerve blocks last anywhere from 6-36 hours. You should start taking your pain medication before the block wears off or when you first begin feeling discomfort. It takes at least 30-60 minutes for a pain pill to take effect. Pain medications should be taken with food. Consider setting an alarm through the night to help manage your pain level so you do not wake up with too much pain. Pain medicines can cause more sedation and decrease your breathing so ONLY take as directed. If you have Sleep Apnea, you need to use your C-Pap machine. What to expect after a nerve block:    Numbness, tingling- affected area feels heavy or asleep   Weakness or inability to move or control your affected area   Inability to feel temperature changes to your affected area  Usually the weakness wears off first, followed by the tingling or heaviness. You may notice more pain at this point and should start taking your pain meds.    If you had a shoulder block, you may experience:   Mild shortness of breath (may be relieved by sitting up in a chair or recliner)   Hoarse voice   Blurry vision   Unequal pupils   Drooping of your face (eye or lip) on the same side as the nerve block   Swelling at the injection site on the side of your neck  These side effects should resolve as the block wears off   IF you have severe or prolonged shortness of breath- GO to the nearest Emergency Room  If you had a nerve block of your arm or leg:   Protect the arm or leg from extreme hot or cold temperatures   Protect the arm or leg from obstructing blood flow by frequent position changes- Make sure fingers/ toes stay pink and warm. Call surgeon with any changes   For leg block, get assistance walking until the block wears off, i.e.:  crutches, walker, support person    If you continue to feel the effects of the nerve block for longer than 72 hours- call [facility] at [phone] and ask to speak with the Anesthesiologist on call.

## 2022-12-28 NOTE — ANESTHESIA PROCEDURE NOTES
Epidural Block    Patient location during procedure: pre-op  Start time: 12/28/2022 8:44 AM  End time: 12/28/2022 8:55 AM  Reason for block: at surgeon's request  Staffing  Performed: anesthesiologist   Anesthesiologist: Barbie Guevara MD  Epidural  Patient position: sitting  Prep: ChloraPrep  Patient monitoring: cardiac monitor, continuous pulse ox and frequent blood pressure checks  Approach: midline  Location: L3-4  Injection technique: SHIRA air  Provider prep: mask and sterile gloves  Needle  Needle type: Tuohy   Needle gauge: 17 G  Needle length: 6 in  Catheter type: end hole  Catheter size: 18 G  Test dose: negative  Assessment  Hemodynamics: stable  Attempts: 1  Outcomes: uncomplicated and patient tolerated procedure well  Additional Notes  Negative test dose.  Epidural used as primary anesthetic  Preanesthetic Checklist  Completed: patient identified, IV checked, site marked, risks and benefits discussed, surgical/procedural consents, equipment checked, pre-op evaluation, timeout performed, anesthesia consent given, oxygen available and monitors applied/VS acknowledged

## 2022-12-28 NOTE — PROGRESS NOTES
Notified pt and girlfriend of case start delay with verbalization of understanding of pt and girlfriend.

## 2023-01-03 NOTE — H&P
I have reviewed the history and physical and examined the patient and find no relevant changes. I have reviewed with the patient and/or family the risks, benefits, and alternatives to the procedure.     Shanell Naik MD  1/3/2023

## 2023-03-07 ENCOUNTER — TELEPHONE (OUTPATIENT)
Dept: CARDIOLOGY CLINIC | Age: 53
End: 2023-03-07

## 2023-03-07 NOTE — TELEPHONE ENCOUNTER
Duplicate message
[facility] is faxing over an EKG they did and would like LES to read and compare it to the 3/15/22 EKG in pt's record. Asking for LES opinion and also asking for a copy of the 3/15/22 EKG and tracing. Fax # [phone]. Please advise. Pt surgery is Thursday and they would like to have LES's opinion by than.
retired

## 2023-03-07 NOTE — TELEPHONE ENCOUNTER
Pt had pre op yesterday and was told he had a Abnormal EKG at [facility]. Pt asking if you could see results in chart. Pt states he researched some of the results and he thinks he may have had a heartache. Pt asking if he can be seen this week if we could work him in. Please advise.

## 2023-03-07 NOTE — TELEPHONE ENCOUNTER
EKG from [facility] reviewed per LES.  Per LES it is a normal EKG and unchanged from prior EKG. LES recommends regular follow up visit summer 2023 > pt scheduled for 7/6/23, pt agreeable with plan.

## 2023-05-09 ENCOUNTER — OFFICE VISIT (OUTPATIENT)
Dept: CARDIOLOGY CLINIC | Age: 53
End: 2023-05-09
Payer: COMMERCIAL

## 2023-05-09 ENCOUNTER — TELEPHONE (OUTPATIENT)
Dept: CARDIOLOGY CLINIC | Age: 53
End: 2023-05-09

## 2023-05-09 VITALS
HEIGHT: 70 IN | BODY MASS INDEX: 35.03 KG/M2 | WEIGHT: 244.7 LBS | SYSTOLIC BLOOD PRESSURE: 144 MMHG | DIASTOLIC BLOOD PRESSURE: 90 MMHG | HEART RATE: 120 BPM | OXYGEN SATURATION: 97 %

## 2023-05-09 DIAGNOSIS — R00.2 PALPITATIONS: Primary | ICD-10-CM

## 2023-05-09 PROCEDURE — 3077F SYST BP >= 140 MM HG: CPT | Performed by: NURSE PRACTITIONER

## 2023-05-09 PROCEDURE — 93000 ELECTROCARDIOGRAM COMPLETE: CPT | Performed by: NURSE PRACTITIONER

## 2023-05-09 PROCEDURE — 3080F DIAST BP >= 90 MM HG: CPT | Performed by: NURSE PRACTITIONER

## 2023-05-09 PROCEDURE — 99214 OFFICE O/P EST MOD 30 MIN: CPT | Performed by: NURSE PRACTITIONER

## 2023-05-09 RX ORDER — RIVAROXABAN 20 MG/1
20 TABLET, FILM COATED ORAL DAILY
COMMUNITY
Start: 2023-04-05

## 2023-05-09 RX ORDER — LISINOPRIL 20 MG/1
20 TABLET ORAL DAILY
Qty: 90 TABLET | Refills: 3 | Status: SHIPPED | OUTPATIENT
Start: 2023-05-09

## 2023-05-09 NOTE — TELEPHONE ENCOUNTER
Patient saw Davey Beal today and she wants to move patient's July 6,23  apppointment with LES to June. Please advise a date/time to schedule patient.   Thank you

## 2023-05-09 NOTE — PROGRESS NOTES
[address]     Outpatient Follow Up Note    CHIEF COMPLAINT / HPI:  Follow Up secondary to palpitations    Subjective:   Norman Duarte is 52 y.o. male who presents today with a history of non-cardiac chest pain and HTN. Interim history: Pt had robotic LLL wedge resection with LND and talc pleurodesis 3/9/23 at [facility] for known carcinoid tumor which increased in size since 8/2020. Immediately following discharge, patient was newly short of breath, tachycardic, febrile, and O2 sat in 70s. CT chest showed PE, no strain on CT or echo. Bilateral LE duplex negative for DVT. He was discharged on eliquis. Oncologist changed him to Xarelto at follow up. Today, Mr Josefina Queen says that he developed worsening palpitations the night he started xarelto. Notices them most when laying down. Reports that BP is trending up at every MD appointment. Denies chest pain, dizziness, or edema. Admits to drinking caffeinated beverages. He is still very winded with minimal activity. Mr Josefina Queen says his oncologist just changed him back to eliquis and he just picked up his new prescription. With regard to medication therapy the patient has been compliant with prescribed regimen. They have tolerated therapy to date. Past Medical History:   Diagnosis Date    Anxiety     COVID     COVID-19 long hauler     Hypertension      Social History:    Social History     Tobacco Use   Smoking Status Former   Smokeless Tobacco Former     Current Medications:  Current Outpatient Medications   Medication Sig Dispense Refill    lisinopril (PRINIVIL;ZESTRIL) 10 MG tablet Take 10 mg by mouth daily      apixaban (ELIQUIS) 2.5 MG TABS tablet Take 1 tablet by mouth 2 times daily 60 tablet 0    clonazePAM (KLONOPIN) 0.5 MG tablet TAKE 1 TABLET BY MOUTH TWICE DAILY       No current facility-administered medications for this visit.      REVIEW OF SYSTEMS:    CONSTITUTIONAL: No major weight gain or loss, fatigue, weakness, night

## 2023-05-10 NOTE — TELEPHONE ENCOUNTER
Please call and schedule pt on 6/7/23 at 1:45 pm at [facility] with LES.

## 2023-05-10 NOTE — TELEPHONE ENCOUNTER
Phoned patient lvm to cb and reschedule LES appt. from 07/06/23 to 06/07/23 1:45 LES, per Rosalia Geiger  Can then cancel 07/06/23 appt.

## 2023-06-07 ENCOUNTER — OFFICE VISIT (OUTPATIENT)
Dept: CARDIOLOGY CLINIC | Age: 53
End: 2023-06-07
Payer: COMMERCIAL

## 2023-06-07 VITALS
BODY MASS INDEX: 35.09 KG/M2 | WEIGHT: 245.1 LBS | OXYGEN SATURATION: 98 % | HEART RATE: 114 BPM | HEIGHT: 70 IN | DIASTOLIC BLOOD PRESSURE: 90 MMHG | SYSTOLIC BLOOD PRESSURE: 142 MMHG

## 2023-06-07 DIAGNOSIS — E78.2 MIXED HYPERLIPIDEMIA: ICD-10-CM

## 2023-06-07 DIAGNOSIS — R06.02 SHORTNESS OF BREATH: ICD-10-CM

## 2023-06-07 DIAGNOSIS — I10 PRIMARY HYPERTENSION: ICD-10-CM

## 2023-06-07 DIAGNOSIS — R07.89 OTHER CHEST PAIN: ICD-10-CM

## 2023-06-07 DIAGNOSIS — R00.2 PALPITATIONS: Primary | ICD-10-CM

## 2023-06-07 PROCEDURE — 93000 ELECTROCARDIOGRAM COMPLETE: CPT | Performed by: INTERNAL MEDICINE

## 2023-06-07 PROCEDURE — 99214 OFFICE O/P EST MOD 30 MIN: CPT | Performed by: INTERNAL MEDICINE

## 2023-06-07 PROCEDURE — 3078F DIAST BP <80 MM HG: CPT | Performed by: INTERNAL MEDICINE

## 2023-06-07 PROCEDURE — 3074F SYST BP LT 130 MM HG: CPT | Performed by: INTERNAL MEDICINE

## 2023-06-07 RX ORDER — APIXABAN 5 MG/1
TABLET, FILM COATED ORAL 2 TIMES DAILY
COMMUNITY
Start: 2023-06-05

## 2023-06-07 NOTE — PATIENT INSTRUCTIONS
Sed rate, CRP (labs)  Cardiac MRI > looking for effects from COVID, r/o myocarditis   Continue risk factor modifications. Call for any change in symptoms, call to report any changes in shortness of breath or development of chest pain with activity.     Follow up in 6 mos

## 2023-07-21 ENCOUNTER — HOSPITAL ENCOUNTER (OUTPATIENT)
Dept: MRI IMAGING | Age: 53
Discharge: HOME | End: 2023-07-21
Attending: INTERNAL MEDICINE
Payer: COMMERCIAL

## 2023-07-21 DIAGNOSIS — R06.02 SHORTNESS OF BREATH: ICD-10-CM

## 2023-07-21 DIAGNOSIS — R07.89 OTHER CHEST PAIN: ICD-10-CM

## 2023-07-21 DIAGNOSIS — R07.89 OTHER CHEST PAIN: Primary | ICD-10-CM

## 2023-07-21 PROCEDURE — A9585 GADOBUTROL INJECTION: HCPCS | Performed by: INTERNAL MEDICINE

## 2023-07-21 PROCEDURE — 75561 CARDIAC MRI FOR MORPH W/DYE: CPT

## 2023-07-21 PROCEDURE — 6360000004 HC RX CONTRAST MEDICATION: Performed by: INTERNAL MEDICINE

## 2023-07-21 RX ADMIN — GADOBUTROL 15 ML: 604.72 INJECTION INTRAVENOUS at 20:10

## 2023-07-25 ENCOUNTER — TELEPHONE (OUTPATIENT)
Dept: CARDIOLOGY CLINIC | Age: 53
End: 2023-07-25

## 2023-07-25 LAB
LV EF: 53 %
LVEF MODALITY: NORMAL

## 2023-09-20 ENCOUNTER — TELEPHONE (OUTPATIENT)
Dept: CARDIOLOGY CLINIC | Age: 53
End: 2023-09-20

## 2023-09-20 NOTE — TELEPHONE ENCOUNTER
Spoke with patient.  Chest pain seems atypical. Reviewed test findings over the years with low suspicion for cardiac symptoms

## 2023-09-20 NOTE — TELEPHONE ENCOUNTER
Pt states he has been having chest pain over the last 5 days. States it can occur with activity and at rest. Pt states he does have chronic sob.  Please call to advise

## 2023-11-14 NOTE — PROGRESS NOTES
[facility]   Cardiac Follow Up     Referring Provider:  Anna Strauss MD     Chief Complaint   Patient presents with    Shortness of Breath    Hypertension    Hyperlipidemia     Very short of breath with exertion. Weight gain. History of Present Illness:  Elizabeth Yi is a 52 y.o. male with a history of non-cardiac chest pain and HTN. He was hospitalized for approximately 1 month in Sept 2021 for Covid parainfluenza PNA and at that time he had acute kidney insufficiency and RLL PE (Eliquis stopped 1/1/22). He then spent a few weeks in [facility] in Oct, was discharged on home 02. Patient seen by [facility] on 3-15-22 for shortness of breath since Covid and HR 130s with movement - echo and PFTs ordered (see below). On last OV 4/1822, patient reports having multiple lingering symptoms since his COVID illness in Sept '21 including loss of hair, brain fog, visual changes, weight gain, ongoing GI symptoms, and shortness of breath with activity. He has also noticed tachycardia with activity that can persist for hours after an activity is finished. Patient seen by [facility] 5/17/22 with increased palpitations since stopping the metoprolol. He was told to resume his beta blocker at the original dose. He had taken the medication every day for the past two weeks and has noticed no change in symptoms. He thinks his fast heart rates overall are improving, though. He then wore a cardiac monitor. Pt had robotic LLL wedge resection with LND and talc pleurodesis 3/9/23 at [facility] for known carcinoid tumor which increased in size since 8/2020. Immediately following discharge, patient was newly short of breath, tachycardic, febrile, and O2 sat in 70s. CT chest showed PE, no strain on CT or echo. Bilateral LE duplex negative for DVT. He was discharged on eliquis. Oncologist changed him to Xarelto at follow up. Today, he is here for 6 mos follow up. He has gained weight.  He tries to stay as

## 2023-12-11 ENCOUNTER — OFFICE VISIT (OUTPATIENT)
Dept: CARDIOLOGY CLINIC | Age: 53
End: 2023-12-11
Payer: COMMERCIAL

## 2023-12-11 VITALS
HEART RATE: 97 BPM | WEIGHT: 264 LBS | BODY MASS INDEX: 37.8 KG/M2 | SYSTOLIC BLOOD PRESSURE: 144 MMHG | OXYGEN SATURATION: 97 % | DIASTOLIC BLOOD PRESSURE: 98 MMHG | HEIGHT: 70 IN

## 2023-12-11 DIAGNOSIS — I10 ESSENTIAL HYPERTENSION: ICD-10-CM

## 2023-12-11 DIAGNOSIS — R06.02 SOB (SHORTNESS OF BREATH): ICD-10-CM

## 2023-12-11 DIAGNOSIS — R07.89 OTHER CHEST PAIN: ICD-10-CM

## 2023-12-11 DIAGNOSIS — R00.2 PALPITATIONS: Primary | ICD-10-CM

## 2023-12-11 DIAGNOSIS — E78.49 OTHER HYPERLIPIDEMIA: ICD-10-CM

## 2023-12-11 PROCEDURE — 3080F DIAST BP >= 90 MM HG: CPT | Performed by: INTERNAL MEDICINE

## 2023-12-11 PROCEDURE — 3077F SYST BP >= 140 MM HG: CPT | Performed by: INTERNAL MEDICINE

## 2023-12-11 PROCEDURE — 99214 OFFICE O/P EST MOD 30 MIN: CPT | Performed by: INTERNAL MEDICINE

## 2023-12-11 RX ORDER — LISINOPRIL 20 MG/1
20 TABLET ORAL DAILY
Qty: 90 TABLET | Refills: 3 | Status: SHIPPED | OUTPATIENT
Start: 2023-12-11

## 2023-12-11 RX ORDER — ASPIRIN 81 MG/1
81 TABLET ORAL DAILY
COMMUNITY

## 2023-12-11 NOTE — PATIENT INSTRUCTIONS
Increase lisinopril to 20 mg daily  Try to avoid sugar and carbohydrates  Continue risk factor modifications. Call for any change in symptoms, call to report any changes in shortness of breath or development of chest pain with activity.     Follow up in 6 mos

## 2024-05-03 ENCOUNTER — OFFICE VISIT (OUTPATIENT)
Dept: CARDIOLOGY CLINIC | Age: 54
End: 2024-05-03
Payer: COMMERCIAL

## 2024-05-03 VITALS
HEIGHT: 70 IN | WEIGHT: 267 LBS | HEART RATE: 107 BPM | SYSTOLIC BLOOD PRESSURE: 140 MMHG | DIASTOLIC BLOOD PRESSURE: 88 MMHG | BODY MASS INDEX: 38.22 KG/M2 | OXYGEN SATURATION: 99 %

## 2024-05-03 DIAGNOSIS — R07.89 OTHER CHEST PAIN: ICD-10-CM

## 2024-05-03 DIAGNOSIS — I10 ESSENTIAL HYPERTENSION: ICD-10-CM

## 2024-05-03 DIAGNOSIS — E78.49 OTHER HYPERLIPIDEMIA: ICD-10-CM

## 2024-05-03 DIAGNOSIS — R00.2 PALPITATIONS: ICD-10-CM

## 2024-05-03 DIAGNOSIS — R06.02 SOB (SHORTNESS OF BREATH): Primary | ICD-10-CM

## 2024-05-03 PROCEDURE — 93000 ELECTROCARDIOGRAM COMPLETE: CPT | Performed by: INTERNAL MEDICINE

## 2024-05-03 PROCEDURE — 3077F SYST BP >= 140 MM HG: CPT | Performed by: INTERNAL MEDICINE

## 2024-05-03 PROCEDURE — 3079F DIAST BP 80-89 MM HG: CPT | Performed by: INTERNAL MEDICINE

## 2024-05-03 PROCEDURE — 99214 OFFICE O/P EST MOD 30 MIN: CPT | Performed by: INTERNAL MEDICINE

## 2024-05-03 RX ORDER — OXYCODONE HYDROCHLORIDE 5 MG/1
5 TABLET ORAL EVERY 6 HOURS PRN
COMMUNITY
Start: 2024-04-08

## 2024-05-03 RX ORDER — METOPROLOL TARTRATE 100 MG/1
TABLET ORAL
Qty: 2 TABLET | Refills: 0 | Status: SHIPPED | OUTPATIENT
Start: 2024-05-03

## 2024-05-03 NOTE — PATIENT INSTRUCTIONS
Coronary CT angiogram > take 100 mg Lopresor the evening before test and 1 tablet morning of test  No medication changes  Continue risk factor modifications.   Call for any change in symptoms, call to report any changes in shortness of breath or development of chest pain with activity.    Follow up based on test results

## 2024-05-03 NOTE — PROGRESS NOTES
55%, normal VV size, wall thickness, and EF of 55%  Advised to wean off Metoprolol tartrate last OV 4/18/22 due to ongoing lung issues post COVID, but then had increased palpitations, so advised to resume but did continue with palpations.   Cardiac monitor 5/17/22-5/20/22 showed 2 short PAT, symptoms while in SVT.  Remain on lisinopril to 20 mg daily     3. Hyperlipidemia   ~ 7/30/2020   HDL 42 ; no longer taking pravastatin / fish oil   ~CT calcium score : zero     4. Chest pain   Last couple days, he's having cp and sob has been more than usual.   Calcium score 2/1/21 zero      1/4/21>  Normal stress echocardiogram study.   Stress echo 8/22/22 > no evidence of ischemia  Cardiac calcium score 0   5.      SOB -        He is more sob than usual        HX of Covid 19- hospitalized in sept, inpatient rehab in Oct      Summary of echo 4/5/22   Normal left ventricle size, wall thickness and systolic function with an   estimated ejection fraction of 55%.   No definitive regional wall motion abnormalities are noted.   Normal function of all valves.   Normal diastolic function.   Unable to estimate pulmonary artery pressure secondary to incomplete TR jet   envelope.        PFTs 4/5/22      Interpretation:  Restrictive lung disease with normal diffusion capacity.      Has ongoing shortness of breath post COVID infection.    ~ s/p robotic LLL wedge resection with LND and talc pleurodesis 3/9/23 for carcinoid tumor     6  h/o PE       most recent PE after wedge resection 3/9/23       Was on Xarelto, says onc changed him back to eliquis       He is taking 81 mg Aspirin daily, not xarelto or eliquis           Patient will be started on new medication Lopressor (x 2 doses).  Patient verbalizes understanding of the need for treatment and education provided at today's visit. Additional education materials will be provided in the AVS.         Plan:      Cardiac test and lab results personally reviewed by me during

## 2024-05-20 ENCOUNTER — TELEPHONE (OUTPATIENT)
Dept: INTERVENTIONAL RADIOLOGY/VASCULAR | Age: 54
End: 2024-05-20

## 2024-05-28 ENCOUNTER — HOSPITAL ENCOUNTER (OUTPATIENT)
Dept: CT IMAGING | Age: 54
Discharge: HOME | End: 2024-05-28
Attending: INTERNAL MEDICINE
Payer: COMMERCIAL

## 2024-05-28 VITALS
SYSTOLIC BLOOD PRESSURE: 165 MMHG | BODY MASS INDEX: 38.65 KG/M2 | HEIGHT: 70 IN | RESPIRATION RATE: 18 BRPM | WEIGHT: 270 LBS | TEMPERATURE: 98.1 F | HEART RATE: 88 BPM | OXYGEN SATURATION: 98 % | DIASTOLIC BLOOD PRESSURE: 87 MMHG

## 2024-05-28 DIAGNOSIS — R06.02 SOB (SHORTNESS OF BREATH): ICD-10-CM

## 2024-05-28 DIAGNOSIS — E78.49 OTHER HYPERLIPIDEMIA: ICD-10-CM

## 2024-05-28 DIAGNOSIS — R07.89 OTHER CHEST PAIN: ICD-10-CM

## 2024-05-28 DIAGNOSIS — R00.2 PALPITATIONS: ICD-10-CM

## 2024-05-28 DIAGNOSIS — I10 ESSENTIAL HYPERTENSION: ICD-10-CM

## 2024-05-28 LAB
CREAT SERPL-MCNC: 1.2 MG/DL (ref 0.9–1.3)
GFR SERPLBLD CREATININE-BSD FMLA CKD-EPI: 71 ML/MIN/{1.73_M2}

## 2024-05-28 PROCEDURE — 75574 CT ANGIO HRT W/3D IMAGE: CPT

## 2024-05-28 PROCEDURE — 6360000004 HC RX CONTRAST MEDICATION: Performed by: INTERNAL MEDICINE

## 2024-05-28 PROCEDURE — 2500000003 HC RX 250 WO HCPCS: Performed by: RADIOLOGY

## 2024-05-28 PROCEDURE — 36415 COLL VENOUS BLD VENIPUNCTURE: CPT

## 2024-05-28 PROCEDURE — 82565 ASSAY OF CREATININE: CPT

## 2024-05-28 RX ORDER — NITROGLYCERIN 0.4 MG/1
0.4 TABLET SUBLINGUAL ONCE
Status: DISCONTINUED | OUTPATIENT
Start: 2024-05-28 | End: 2024-05-29 | Stop reason: HOSPADM

## 2024-05-28 RX ORDER — METOPROLOL TARTRATE 1 MG/ML
5 INJECTION, SOLUTION INTRAVENOUS EVERY 5 MIN PRN
Status: DISCONTINUED | OUTPATIENT
Start: 2024-05-28 | End: 2024-05-29 | Stop reason: HOSPADM

## 2024-05-28 RX ADMIN — METOPROLOL TARTRATE 5 MG: 5 INJECTION INTRAVENOUS at 09:49

## 2024-05-28 RX ADMIN — METOPROLOL TARTRATE 5 MG: 5 INJECTION INTRAVENOUS at 09:57

## 2024-05-28 RX ADMIN — IOPAMIDOL 120 ML: 755 INJECTION, SOLUTION INTRAVENOUS at 10:11

## 2024-05-28 ASSESSMENT — PAIN - FUNCTIONAL ASSESSMENT: PAIN_FUNCTIONAL_ASSESSMENT: NONE - DENIES PAIN

## 2024-05-28 NOTE — PROGRESS NOTES
Pt arrives to pre procedure area in stable condition from home. Vital signs stable.  Assessment completed see flow sheet.  IV patent.  Procedure explained to pt who states understanding and questions answered. Consent signed.

## 2024-05-28 NOTE — PROGRESS NOTES
Tolerated scan without problems. IV taken out. /100 post scan. No nitroglycerin given at this time.  Patient discharged home with belongings and discharge instructions.

## 2024-10-08 ENCOUNTER — TELEPHONE (OUTPATIENT)
Dept: CARDIOLOGY CLINIC | Age: 54
End: 2024-10-08

## 2024-10-08 NOTE — TELEPHONE ENCOUNTER
The patient phoned wanting to schedule with LES or NP.  He has been having SOB, numbness in his fingers and feeling like he has hot flashes and sweating.  Please advise  Thank you    Patient scheduled NPTS 10/09/24 at 10:00 in [place]

## 2024-10-09 ENCOUNTER — OFFICE VISIT (OUTPATIENT)
Dept: CARDIOLOGY CLINIC | Age: 54
End: 2024-10-09
Payer: COMMERCIAL

## 2024-10-09 VITALS
WEIGHT: 269 LBS | HEIGHT: 70 IN | BODY MASS INDEX: 38.51 KG/M2 | DIASTOLIC BLOOD PRESSURE: 72 MMHG | OXYGEN SATURATION: 99 % | SYSTOLIC BLOOD PRESSURE: 138 MMHG | HEART RATE: 100 BPM

## 2024-10-09 DIAGNOSIS — R20.2 NUMBNESS AND TINGLING IN RIGHT HAND: ICD-10-CM

## 2024-10-09 DIAGNOSIS — I10 PRIMARY HYPERTENSION: ICD-10-CM

## 2024-10-09 DIAGNOSIS — R06.02 SHORTNESS OF BREATH: Primary | ICD-10-CM

## 2024-10-09 DIAGNOSIS — R20.0 NUMBNESS AND TINGLING IN RIGHT HAND: ICD-10-CM

## 2024-10-09 PROCEDURE — 99214 OFFICE O/P EST MOD 30 MIN: CPT | Performed by: NURSE PRACTITIONER

## 2024-10-09 PROCEDURE — 3078F DIAST BP <80 MM HG: CPT | Performed by: NURSE PRACTITIONER

## 2024-10-09 PROCEDURE — 3075F SYST BP GE 130 - 139MM HG: CPT | Performed by: NURSE PRACTITIONER

## 2024-10-09 NOTE — PROGRESS NOTES
[facility]     Outpatient Follow Up Note    Jaswinder Navarrete is 54 y.o. male who presents today with a history of non-cardiac CP, tachycardia (short episodes of PAT and SVT with event monitor May '22) and HTN .      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 Preston     CHIEF COMPLAINT / HPI:  Follow Up secondary to SOB & numbness in the fingertips of his Rt hand    Subjective:   He has concerns that the past couple of weeks he gets hot flashes with exertion. He's not had a fever nor has been sick.    His Rt hand is a mess. It gets numb. It started when he started packing up to move. The tip of his ring finger on his right hand has stayed numb. His thumbs get stuck and hurt, Rt > Lt.   He's felt off balance.     He denies significant chest pain. As far as breathing, he has good conversations when he's had rest. He can't speak to feeling SOB but can tell if its worse. Going up steps / up ladders or carrying groceries is when he notices it.  The patient denies orthopnea/PND. The patient does not have swelling. The patients weight is down / doesn't weigh at home. He was around 277# at a different doc office and 269# here today. The patient is not experiencing palpitations or dizziness. He gets light headed / off balance of which he noticed from anxiety.      These symptoms are stable since the last OV though new sweating   With regard to medication therapy the patient has been compliant with prescribed regimen. They have tolerated therapy to date.     Past Medical History:   Diagnosis Date    Anxiety     COVID     COVID-19 long hauler     Hypertension      Social History:    Social History     Tobacco Use   Smoking Status Former   Smokeless Tobacco Former     Current Medications:  Current Outpatient Medications   Medication Sig Dispense

## 2024-10-17 ENCOUNTER — TELEPHONE (OUTPATIENT)
Dept: CARDIOLOGY CLINIC | Age: 54
End: 2024-10-17

## 2024-10-17 NOTE — TELEPHONE ENCOUNTER
I spoke with pt and relayed Echo results per NPTS. Pt verbalized understanding.    Patient stated that he gets over heated almost like an anxiety type thing and he is very sweaty all day long.  He also has numbness in two of his fingers on the right hand and he thinks this might be a side effect from the Xarelto.      At the appointment for his Echo his BP was 166/109 and this was taken with a digital blood pressure machine.  He took an extra Lisinopril when he got home because of the BP.      He would like a call back from either NPTS or LES please.

## 2024-10-17 NOTE — TELEPHONE ENCOUNTER
Spoke to Marcelo.     Marcelo explains that the sweating, is \"like a hot flash\" has been going on for a couple of weeks.  The finger tip numbness has been happening intermittently for \"easily a month\" and it started when he was moving. His chest hurts in the center of his chest and he feels like it is \"muscular,\" and pain increases when he pushes on his sternum.  He has had no increase in sob since his diagnosis of covid a while ago.

## 2024-10-18 NOTE — TELEPHONE ENCOUNTER
Spoke to pt about message below he verbalized understanding.      Alessandra Funez, DICKSON - CNP  [facility] Staff32 minutes ago (4:02 PM)       He has cervical disc disease. We talked about this possibly causing his finger tingling.  Questions with his xarelto needs to be addressed by his oncologist

## 2024-10-29 ENCOUNTER — TELEPHONE (OUTPATIENT)
Dept: CARDIOLOGY CLINIC | Age: 54
End: 2024-10-29

## 2024-10-29 DIAGNOSIS — R61 DIAPHORESIS: Primary | ICD-10-CM

## 2024-10-29 NOTE — TELEPHONE ENCOUNTER
Spoke with patient,  he stated he has been sweating profusely, has occasional light-headedness and pain that starts in his back on/off since 9/23/24. He had Covid in 9/2021 and wonders if his symptoms could be related to the Covid.  It doesn't take a lot of activity and he is sweating profusely.   Patient states he had a blood cot in his lungs after surgery back in March or April.     Unable to obtain vitals.

## 2024-10-29 NOTE — TELEPHONE ENCOUNTER
Pt called to request to speak w/NPTS re: his sweating w/activity, hot flashes, pain at time in his left arm, chest pain at times.  Pt is wanting to know if there is another test that can be ran to make sure nothing is occurring medically.  Please call to discuss his concerns.  Thank you

## 2024-10-30 NOTE — TELEPHONE ENCOUNTER
Every time he gets up to do anything he sweats profusely. It started about a month ago and seems to be getting worse    He gets a pain in his arm right b/w. His triceps and bicep : of which he's had  He gets a hot / icy discomfort in his back  He's always out of breath since having COVID    He's read that long hauler covid can cause it as well as xarelto       Will get a sed rate / CMP / CBC

## 2024-10-31 NOTE — TELEPHONE ENCOUNTER
Alessandra Funez, APRN - CNP  You2 minutes ago (1:38 PM)         Per LES: symptoms are not cardiac related. Needs to f/u with PCP   Called pt and informed him of the message per NPTS, Per LES, he verbalized understanding.

## 2025-01-03 ENCOUNTER — TELEPHONE (OUTPATIENT)
Dept: CARDIOLOGY CLINIC | Age: 55
End: 2025-01-03

## 2025-01-03 NOTE — TELEPHONE ENCOUNTER
I spoke to pt and the dizziness and off balance is new. He does not have a B/P cuff to check his B/P. He is worried this is cardiac related.

## 2025-01-03 NOTE — TELEPHONE ENCOUNTER
Pt states he has increased sweating and Having SOB at rest along with dizziness/off balance. Increased over last week.  States he did not fu with PCP as suggested in October. Please call to advise.

## 2025-02-03 RX ORDER — LISINOPRIL 20 MG/1
20 TABLET ORAL DAILY
Qty: 90 TABLET | Refills: 3 | Status: SHIPPED | OUTPATIENT
Start: 2025-02-03

## 2025-02-03 NOTE — TELEPHONE ENCOUNTER
Received refill request for Lisinopril 20 Mg from [facility] [id number]  [facility] [id number] pharmacy.     Last OV: 10/09/24    Next OV: None    Last Labs: BMP 03/15/22 CMP 10/30/24    Last Filled: 12/11/23

## 2025-03-11 NOTE — PROGRESS NOTES
expansion without use of accessory muscles  Resp Auscultation: Normal breath sounds without dullness  Cardiovascular:  The apical impulses not displaced  Heart tones are crisp and normal  Cervical veins are not engorged  The carotid upstroke is normal in amplitude and contour without delay or bruit  Peripheral pulses are symmetrical and full  There is no clubbing, cyanosis of the extremities.  No edema  Femoral Arteries: 2+ and equal  Pedal Pulses: 2+ and equal   Abdomen:  No masses or tenderness  Liver/Spleen: No Abnormalities Noted  Neurological/Psychiatric:  Alert and oriented in all spheres  Moves all extremities well  Exhibits normal gait balance and coordination  No abnormalities of mood, affect, memory, mentation, or behavior are noted    Stress echo 9/8/22   Summary  No evidence of ischemia on treadmill stress echo.  Patient developed hypoxia to 89% with peak stress that resolved in recovery.    Cardiac calcium score 2021 was zero  Cardiac MRI 7/21/23  CONCLUSIONS   Overall there is low normal LV systolic function along with mildly reduced RV systolic function.   LGE findings are somewhat nonspecific vs. previous myocarditis. (very faint enhancement)     -Normal left ventricular size and low normal systolic function with a calculated ejection fraction of 53% by Lomeli's method.  -Normal right ventricular size with mildly reduced systolic function with a calculated ejection fraction of 40% by Lomeli's method.  -No myocardial edema by T2w imaging/mapping. (Normal myocardium/skeletal ratio - normal < 1.9).  -No intracardiac shunt. Calculated Qp:Qs:1.06 (Phase contrast velocity encoding Ao/Pa)  -Normal myocardial resting perfusion imaging.  -On delayed enhancement imaging, there is very faint selvin enhancement involving the basal inferolateral myocardium. Findings can be non specific vs mild resolving myocarditis.     The MRI sequences and imaging planes in this study were tailored for cardiac imaging and are

## 2025-04-02 ENCOUNTER — OFFICE VISIT (OUTPATIENT)
Dept: CARDIOLOGY CLINIC | Age: 55
End: 2025-04-02
Payer: COMMERCIAL

## 2025-04-02 VITALS
BODY MASS INDEX: 38.34 KG/M2 | OXYGEN SATURATION: 97 % | HEIGHT: 70 IN | DIASTOLIC BLOOD PRESSURE: 90 MMHG | SYSTOLIC BLOOD PRESSURE: 146 MMHG | HEART RATE: 110 BPM | WEIGHT: 267.8 LBS

## 2025-04-02 DIAGNOSIS — R00.2 PALPITATIONS: Primary | ICD-10-CM

## 2025-04-02 DIAGNOSIS — E78.49 OTHER HYPERLIPIDEMIA: ICD-10-CM

## 2025-04-02 DIAGNOSIS — R06.02 SOB (SHORTNESS OF BREATH): ICD-10-CM

## 2025-04-02 DIAGNOSIS — R07.89 OTHER CHEST PAIN: ICD-10-CM

## 2025-04-02 DIAGNOSIS — R06.02 SHORTNESS OF BREATH: ICD-10-CM

## 2025-04-02 DIAGNOSIS — I10 ESSENTIAL HYPERTENSION: ICD-10-CM

## 2025-04-02 PROCEDURE — 99214 OFFICE O/P EST MOD 30 MIN: CPT | Performed by: INTERNAL MEDICINE

## 2025-04-02 PROCEDURE — 3077F SYST BP >= 140 MM HG: CPT | Performed by: INTERNAL MEDICINE

## 2025-04-02 PROCEDURE — 93000 ELECTROCARDIOGRAM COMPLETE: CPT | Performed by: INTERNAL MEDICINE

## 2025-04-02 PROCEDURE — 3080F DIAST BP >= 90 MM HG: CPT | Performed by: INTERNAL MEDICINE

## 2025-04-02 NOTE — PATIENT INSTRUCTIONS
Call Dr Perrin if you decide to start Ozympic or Zepbound  No medication changes  Continue risk factor modifications.   Call for any change in symptoms, call to report any changes in shortness of breath or development of chest pain with activity.    Follow up in 6 mos with echocardiogram

## (undated) DEVICE — DUAL CUT SAGITTAL BLADE

## (undated) DEVICE — BLANKET WRM W29.9XL79.1IN UP BODY FORC AIR MISTRAL-AIR

## (undated) DEVICE — PADDING CAST N ADH 12X6 IN CRIMPED FINISH 100% COTTON WBRLII

## (undated) DEVICE — ELECTRODE PT RET AD L9FT HI MOIST COND ADH HYDRGEL CORDED

## (undated) DEVICE — PIN DRL L75MM DIA3.2MM HEX 2.5MM TRCR TIP DISP FOR PERSONA

## (undated) DEVICE — APPLICATOR PREP 26ML 0.7% IOD POVACRYLEX 74% ISO ALC ST

## (undated) DEVICE — TOWEL,OR,DSP,ST,BLUE,DLX,8/PK,10PK/CS: Brand: MEDLINE

## (undated) DEVICE — BIPOLAR SEALER 23-112-1 AQM 6.0: Brand: AQUAMANTYS ®

## (undated) DEVICE — BLADE, TONGUE, 6", STERILE: Brand: MEDLINE

## (undated) DEVICE — SOLUTION IV 1000ML 0.9% SOD CHL

## (undated) DEVICE — SUTURE MCRYL SZ 4-0 L27IN ABSRB UD L19MM PS-2 1/2 CIR PRIM Y426H

## (undated) DEVICE — 3 BONE CEMENT MIXER: Brand: MIXEVAC

## (undated) DEVICE — SOLUTION IV 100ML 0.9% SOD CHL PLAS CONT USP VIAFLX 1 PER

## (undated) DEVICE — TOTAL KNEE: Brand: MEDLINE INDUSTRIES, INC.

## (undated) DEVICE — SUTURE STRATAFIX SPRL SZ 1 L14IN ABSRB VLT L48CM CTX 1/2 SXPD2B405

## (undated) DEVICE — ZIMMER® STERILE DISPOSABLE TOURNIQUET CUFF WITH PLC, DUAL PORT, SINGLE BLADDER, 30 IN. (76 CM)

## (undated) DEVICE — SUTURE VCRL SZ 2-0 L18IN ABSRB UD CT-1 L36MM 1/2 CIR J839D

## (undated) DEVICE — SYSTEM SKIN CLSR 22CM DERMBND PRINEO

## (undated) DEVICE — SST BUR, WIRE PASS DRILL, 2 FLUTES, MED., 2MM DIA.: Brand: MICROAIRE®

## (undated) DEVICE — 3M™ IOBAN™ 2 ANTIMICROBIAL INCISE DRAPE 6640EZ: Brand: IOBAN™ 2

## (undated) DEVICE — UNDERGLOVE SURG SZ 8.5 FNGR THK0.21MIL GRN LTX BEAD CUF

## (undated) DEVICE — SOLUTION IRRIG 3000ML 0.9% SOD CHL USP UROMATIC PLAS CONT

## (undated) DEVICE — TOWEL,STOP FLAG GOLD N-W: Brand: MEDLINE

## (undated) DEVICE — BLADE SAW RECIP HVY DUTY LNG 27796327] STRYKER CORP]

## (undated) DEVICE — SUTURE VCRL SZ 0 L18IN ABSRB UD L36MM CT-1 1/2 CIR J840D

## (undated) DEVICE — SYRINGE CATH TIP 50ML

## (undated) DEVICE — 3M™ COBAN™ NL STERILE NON-LATEX SELF-ADHERENT WRAP, 2086S, 6 IN X 5 YD (15 CM X 4,5 M), 12 ROLLS/CASE: Brand: 3M™ COBAN™

## (undated) DEVICE — Device

## (undated) DEVICE — GLOVE SURG SZ 85 L12IN FNGR ORTHO 126MIL CRM LTX FREE

## (undated) DEVICE — SUTURE VCRL SZ 1 L18IN ABSRB UD L36MM CT-1 1/2 CIR J841D

## (undated) DEVICE — DRESSING WND ISL THERABOND 4X8IN

## (undated) DEVICE — KNEE HOLDER DISPOSABLE LINER: Brand: ALVARADO®  KNEE SUPPORT